# Patient Record
Sex: MALE | Race: WHITE | NOT HISPANIC OR LATINO | Employment: UNEMPLOYED | URBAN - NONMETROPOLITAN AREA
[De-identification: names, ages, dates, MRNs, and addresses within clinical notes are randomized per-mention and may not be internally consistent; named-entity substitution may affect disease eponyms.]

---

## 2022-04-26 ENCOUNTER — HOSPITAL ENCOUNTER (EMERGENCY)
Facility: HOSPITAL | Age: 32
Discharge: HOME | End: 2022-04-26
Attending: EMERGENCY MEDICINE | Admitting: EMERGENCY MEDICINE

## 2022-04-26 ENCOUNTER — APPOINTMENT (OUTPATIENT)
Dept: ULTRASOUND IMAGING | Facility: HOSPITAL | Age: 32
End: 2022-04-26

## 2022-04-26 VITALS
BODY MASS INDEX: 45.62 KG/M2 | RESPIRATION RATE: 19 BRPM | WEIGHT: 308 LBS | TEMPERATURE: 99.4 F | OXYGEN SATURATION: 99 % | HEART RATE: 100 BPM | SYSTOLIC BLOOD PRESSURE: 168 MMHG | DIASTOLIC BLOOD PRESSURE: 97 MMHG | HEIGHT: 69 IN

## 2022-04-26 DIAGNOSIS — M79.605 ACUTE PAIN OF LEFT LOWER EXTREMITY: Primary | ICD-10-CM

## 2022-04-26 PROCEDURE — 93971 EXTREMITY STUDY: CPT

## 2022-04-26 PROCEDURE — 99282 EMERGENCY DEPT VISIT SF MDM: CPT

## 2022-04-26 NOTE — DISCHARGE INSTRUCTIONS
As we discussed, please take 600 mg of ibuprofen every 6 hours as needed and follow-up with your family doctor Tuesday for recheck.  Return to the ER for any worsening symptoms.

## 2022-04-26 NOTE — ED PROVIDER NOTES
Subjective   Chief Complaint: Left lower extremity pain and swelling  History of Present Illness: 31-year-old male present for evaluation of above complaint.  He states it started Friday.  No known injury.  He had pain and swelling to the left upper calf that is now moved up to the medial distal thigh just above the knee.  No chest pain or shortness of breath.  No history of DVT or PE.  Went to [facility] and was told to come to the ED for eval.  States that [facility] left calf was measured at 20 inches and right was 19.  No symptoms in the right leg.  Recent travel or significant mobilization.  He states the swelling and prominent vein in his calf seems to have gone down some but now has had more proximal symptoms  Onset: 5 days ago  Timing: Ongoing, improving in the calf but now seems to be extending proximally  Exacerbating / Alleviating factors: Worse with palpation of the calf  Associated symptoms: None      Nurses Notes reviewed and agree, including vitals, allergies, social history and prior medical history.          Review of Systems   Constitutional: Negative.    HENT: Negative.    Eyes: Negative.    Respiratory: Negative.    Cardiovascular: Negative.    Gastrointestinal: Negative.    Genitourinary: Negative.    Musculoskeletal:        Left lower extremity pain and swelling   Skin: Negative.    Allergic/Immunologic: Negative.    Neurological: Negative.    Psychiatric/Behavioral: Negative.    All other systems reviewed and are negative.      History reviewed. No pertinent past medical history.    No Known Allergies    No past surgical history on file.    History reviewed. No pertinent family history.    Social History     Socioeconomic History   • Marital status: Single           Objective   Physical Exam  Vitals and nursing note reviewed.   Constitutional:       General: He is not in acute distress.     Appearance: Normal appearance. He is obese. He is not ill-appearing, toxic-appearing or diaphoretic.   HENT:       Head: Normocephalic and atraumatic.      Nose: Nose normal.   Eyes:      Extraocular Movements: Extraocular movements intact.   Cardiovascular:      Rate and Rhythm: Normal rate and regular rhythm.      Pulses: Normal pulses.   Pulmonary:      Effort: Pulmonary effort is normal.   Abdominal:      General: Abdomen is flat.   Musculoskeletal:         General: Normal range of motion.      Cervical back: Normal range of motion.      Right lower leg: Normal.      Left lower leg: Swelling and tenderness present. No deformity, lacerations or bony tenderness. No edema.   Skin:     General: Skin is warm and dry.      Capillary Refill: Capillary refill takes less than 2 seconds.   Neurological:      General: No focal deficit present.      Mental Status: He is alert. Mental status is at baseline.   Psychiatric:         Mood and Affect: Mood normal.         Behavior: Behavior normal.         Procedures           ED Course      No signs of DVT.  No signs of cellulitis.  Lower extremity neurovascular intact.  Has follow-up with PCP Tuesday.  We will have him take NSAIDs until then return if worse.                                           MDM    Final diagnoses:   Acute pain of left lower extremity       ED Disposition  ED Disposition     ED Disposition   Discharge    Condition   Stable    Comment   --             Xuan Borrero MD  [address] [facility] [address]  [phone]    On 5/3/2022           Medication List      No changes were made to your prescriptions during this visit.          Jose Elias Varela PA-C  04/26/22 1426

## 2023-08-25 ENCOUNTER — TRANSCRIBE ORDERS (OUTPATIENT)
Dept: ADMINISTRATIVE | Facility: HOSPITAL | Age: 33
End: 2023-08-25
Payer: COMMERCIAL

## 2023-08-25 DIAGNOSIS — R74.01 NONSPECIFIC ELEVATION OF LEVELS OF TRANSAMINASE OR LACTIC ACID DEHYDROGENASE (LDH): Primary | ICD-10-CM

## 2023-08-25 DIAGNOSIS — R74.02 NONSPECIFIC ELEVATION OF LEVELS OF TRANSAMINASE OR LACTIC ACID DEHYDROGENASE (LDH): Primary | ICD-10-CM

## 2023-10-18 ENCOUNTER — HOSPITAL ENCOUNTER (OUTPATIENT)
Dept: ULTRASOUND IMAGING | Facility: HOSPITAL | Age: 33
Discharge: HOME | End: 2023-10-18
Admitting: FAMILY MEDICINE
Payer: COMMERCIAL

## 2023-10-18 DIAGNOSIS — R74.02 NONSPECIFIC ELEVATION OF LEVELS OF TRANSAMINASE OR LACTIC ACID DEHYDROGENASE (LDH): ICD-10-CM

## 2023-10-18 DIAGNOSIS — R74.01 NONSPECIFIC ELEVATION OF LEVELS OF TRANSAMINASE OR LACTIC ACID DEHYDROGENASE (LDH): ICD-10-CM

## 2023-10-18 PROCEDURE — 76705 ECHO EXAM OF ABDOMEN: CPT

## 2023-10-31 ENCOUNTER — OFFICE VISIT (OUTPATIENT)
Dept: PSYCHIATRY | Facility: CLINIC | Age: 33
End: 2023-10-31
Payer: COMMERCIAL

## 2023-10-31 VITALS
HEART RATE: 100 BPM | OXYGEN SATURATION: 97 % | HEIGHT: 69 IN | SYSTOLIC BLOOD PRESSURE: 124 MMHG | DIASTOLIC BLOOD PRESSURE: 70 MMHG | BODY MASS INDEX: 45.74 KG/M2 | WEIGHT: 308.8 LBS

## 2023-10-31 DIAGNOSIS — F33.2 SEVERE EPISODE OF RECURRENT MAJOR DEPRESSIVE DISORDER, WITHOUT PSYCHOTIC FEATURES: ICD-10-CM

## 2023-10-31 DIAGNOSIS — F41.1 GENERALIZED ANXIETY DISORDER: Primary | ICD-10-CM

## 2023-10-31 RX ORDER — BUPROPION HYDROCHLORIDE 100 MG/1
100 TABLET, EXTENDED RELEASE ORAL 2 TIMES DAILY
Qty: 60 TABLET | Refills: 1 | Status: SHIPPED | OUTPATIENT
Start: 2023-10-31

## 2023-10-31 RX ORDER — ALLOPURINOL 300 MG/1
1 TABLET ORAL DAILY
COMMUNITY
Start: 2022-03-02

## 2023-10-31 RX ORDER — FLUOXETINE HYDROCHLORIDE 40 MG/1
1 CAPSULE ORAL DAILY
COMMUNITY
Start: 2023-07-25
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
## 2023-12-13 ENCOUNTER — TELEPHONE (OUTPATIENT)
Dept: PSYCHIATRY | Facility: CLINIC | Age: 33
End: 2023-12-13
Payer: COMMERCIAL

## 2023-12-13 NOTE — TELEPHONE ENCOUNTER
Roby called and wanted me to let you know that he is needing a letter that he can be on temporary disablement to get [employer] benefits. He said when he spoke to them that they said when he turns into the letter, they would give him forms that you and him both need to fill out. Thank you.

## 2023-12-14 ENCOUNTER — TELEMEDICINE (OUTPATIENT)
Dept: PSYCHIATRY | Facility: CLINIC | Age: 33
End: 2023-12-14
Payer: COMMERCIAL

## 2023-12-14 DIAGNOSIS — F41.1 GENERALIZED ANXIETY DISORDER: Primary | ICD-10-CM

## 2023-12-14 DIAGNOSIS — Z79.899 MEDICATION MANAGEMENT: ICD-10-CM

## 2023-12-14 DIAGNOSIS — F34.1 PERSISTENT DEPRESSIVE DISORDER WITH ANXIOUS DISTRESS, CURRENTLY MODERATE: ICD-10-CM

## 2023-12-14 RX ORDER — FLUOXETINE HYDROCHLORIDE 40 MG/1
40 CAPSULE ORAL DAILY
Qty: 90 CAPSULE | Refills: 0 | Status: SHIPPED | OUTPATIENT
Start: 2023-12-14

## 2023-12-14 NOTE — PROGRESS NOTES
This provider is located at [facility], Behavioral Health, [address], [address] [facility] [place] [address] in [place] [address], using a secure [employer] Video Visit through [employer]. Patient is being seen remotely via telehealth at their home address in [place] [address], and stated they are in a secure environment for this session. The patient's condition being diagnosed/treated is appropriate for telemedicine. The provider identified herself as well as her credentials. The patient, and/or patients guardian, consent to be seen remotely, and when consent is given they understand that the consent allows for patient identifiable information to be sent to a third party as needed. They may refuse to be seen remotely at any time. The electronic data is encrypted and password protected, and the patient and/or guardian has been advised of the potential risks to privacy not withstanding such measures.     You have chosen to receive care through a telehealth visit.  Do you consent to use a video/audio connection for your medical care today? Yes    Subjective   Roby Marvin is a 33 y.o. male who presents today for follow up    Chief Complaint:  poor concentration, anxiety and depression    History of Present Illness:   History of Present Illness  Roby Marvin presents today via MyChart video visit for follow-up to discuss CPT 3 results and possible medication options.  Currently taking Prozac 40 mg daily along with Wellbutrin  mg twice daily.  Says that he noticed some improvement in symptoms associated with focus and concentration when initially began Wellbutrin. Feels that decreased in severity of symptoms lasted for only a short time. No longer feels that Wellbutrin SR has been effective.  CPT 3 completed on 11/3/2023, that there is a total of 4 atypical T-scores which is associated with a moderate likelihood of having a disorder characterized by attention deficits, such as ADHD.  His profile of scores and response  pattern indicates that he may have issues related to inattentiveness (strong indication) and vigilance (some indication).  Reports anxiety and depression are adequately controlled with Prozac. Verbalizes having a constant symptoms of depression that never actually go away, just improve at times. These depressive symptoms have been present for several years. Rates current depression 6-7/10, rates anxiety 6-8/10 on a 0-10 scale with 10 being the worst. Struggling to initiate or complete tasks, maintaining focus and becomes easily distracted. Sleeping up to 8 hours per night with some daytime napping. Denies any issues with appetite. Denies SI/HI.  PHQ-9 total score: 12, TESSIE-7 total score: 5.    Previous Psych Meds: Currently taking fluoxetine (since 7/25/2023) prescribed by PCP, no other previous psychiatric medications    The following portions of the patient's history were reviewed and updated as appropriate: allergies, current medications, past family history, past medical history, past social history, past surgical history and problem list.      Past Medical History:  Past Medical History:   Diagnosis Date    Anxiety ~07/25/23    Depression ~07/25/23    Liver disease 10/17/23    Diagnosed with having fatty liver deposits, not sure if this falls under same category.       Social History:  Social History     Socioeconomic History    Marital status: Single   Tobacco Use    Smoking status: Never     Passive exposure: Yes    Smokeless tobacco: Never    Tobacco comments:     Social smoker, never habitual   Vaping Use    Vaping Use: Never used   Substance and Sexual Activity    Alcohol use: Not Currently     Comment: Social drinker, never habitual    Drug use: Not Currently     Types: Marijuana     Comment: Smoked marijuana socially, never habitual    Sexual activity: Not Currently     Partners: Female     Birth control/protection: Condom, None, Birth control pill       Family History:  Family History   Problem Relation  Age of Onset    Drug abuse Brother     Drug abuse Brother     Seizures Cousin        Past Surgical History:  History reviewed. No pertinent surgical history.    Problem List:  There is no problem list on file for this patient.      Allergy:   Allergies   Allergen Reactions    Banana Itching and Nausea And Vomiting     Itchy throat        Current Medications:   Current Outpatient Medications   Medication Sig Dispense Refill    FLUoxetine (PROzac) 40 MG capsule Take 1 capsule by mouth Daily. 90 capsule 0    allopurinol (ZYLOPRIM) 300 MG tablet Take 1 tablet by mouth Daily.      buPROPion SR (Wellbutrin SR) 100 MG 12 hr tablet Take 1 tablet by mouth 2 (Two) Times a Day. 60 tablet 1     No current facility-administered medications for this visit.     Review of Systems   Constitutional:  Negative for activity change, appetite change, fatigue, unexpected weight gain and unexpected weight loss.   Respiratory:  Negative for shortness of breath.    Cardiovascular:  Negative for chest pain.   Psychiatric/Behavioral:  Positive for decreased concentration, sleep disturbance and depressed mood. Negative for suicidal ideas. The patient is nervous/anxious.      Physical Exam  Constitutional:       General: He is not in acute distress.     Appearance: Normal appearance.   Neurological:      Mental Status: He is alert.     Vitals:   The patient was seen [facility].  Unable to obtain vital signs due to nature of remote visit.    Mental Status Exam:   Hygiene:    Appears good  Cooperation:  Cooperative  Eye Contact:   [facility] r/t video visit  Psychomotor Behavior:  Appropriate  Affect:  Full range and Appropriate  Mood: normal  Hopelessness: Denies  Speech:  Normal  Thought Process:  Goal directed and Linear  Thought Content:  Mood congruent  Suicidal:  None  Homicidal:  None  Hallucinations:  None  Delusion:  None  Memory:  Intact  Orientation:  Person, Place, Time, and Situation  Reliability:   good  Insight:  Fair  Judgement:  Fair  Impulse Control:  Good    Lab Results:   No visits with results within 6 Month(s) from this visit.   Latest known visit with results is:   No results found for any previous visit.       EKG Results:  No orders to display       Assessment & Plan   Problems Addressed this Visit    None  Visit Diagnoses       Generalized anxiety disorder    -  Primary    Relevant Medications    FLUoxetine (PROzac) 40 MG capsule    Persistent depressive disorder with anxious distress, currently moderate        Relevant Medications    FLUoxetine (PROzac) 40 MG capsule    Medication management              Diagnoses         Codes Comments    Generalized anxiety disorder    -  Primary ICD-10-CM: F41.1  ICD-9-CM: 300.02     Persistent depressive disorder with anxious distress, currently moderate     ICD-10-CM: F34.1  ICD-9-CM: 300.4     Medication management     ICD-10-CM: Z79.899  ICD-9-CM: V58.69             Visit Diagnoses:    ICD-10-CM ICD-9-CM   1. Generalized anxiety disorder  F41.1 300.02   2. Persistent depressive disorder with anxious distress, currently moderate  F34.1 300.4   3. Medication management  Z79.899 V58.69       Roby presents today for medication management follow-up via [employer] video visit. CPT 3 results discussed.  Continues to report depression and anxiety, feels that symptoms are adequately controlled with Prozac.  Residual symptoms of anxiety and depression could likely be related to uncontrolled ADHD.  PHQ-9 score of 12 indicating moderate depression, TESSIE-7 score of 5 indicating mild anxiety. Discussed treatment options, voices interest in stimulant medication.  Discussed controlled substance contract agreement and he is agreeable to terms, will sign copy for chart.  Also discussed obtaining UDS.  Once UDS has been obtained and results received, will send Adderall XR 10 mg daily x 15 days.  He is agreeable to call office and update on medication after 2 weeks.  Will continue  Prozac 40 mg daily as previously prescribed.  Agreeable to stop Wellbutrin SR as he does not feel medication has been effective. Denies any adverse effects of medication.  Encouraged to practice good sleep hygiene.  Also discussed importance of counseling, will consider scheduling appointment with counseling.    -Stop Wellbutrin SR due to ineffectiveness   -Start Adderall XR 10 mg daily x 15 days once UDS results have been received.  -Refill Prozac 40 mg daily    -Reviewed previous available documentation and most recent available labs.   JONO reviewed and is appropriate. Patient counseled on use of controlled substances.    GOALS:  Short Term Goals: Patient will be compliant with medication, and patient will have no significant medication related side effects.  Patient will be engaged in psychotherapy as indicated.  Patient will report subjective improvement of symptoms.  Long term goals: To stabilize mood and treat/improve subjective symptoms, the patient will stay out of the hospital, the patient will be at an optimal level of functioning, and the patient will take all medications as prescribed.  The patient/guardian verbalized understanding and agreement with goals that were mutually set.    TREATMENT PLAN: Continue supportive psychotherapy efforts and medications as indicated for patient's diagnosis.  Pharmacological and Non-Pharmacological treatment options discussed during today's visit. Patient/Guardian acknowledged and verbally consented with current treatment plan and was educated on the importance of compliance with treatment and follow-up appointments.      MEDICATION ISSUES:  Discussed medication options and treatment plan of prescribed medication as well as the risks, benefits, any black box warnings, and side effects including potential falls, possible impaired driving, and metabolic adversities among others. Patient is agreeable to call the office with any worsening of symptoms or onset of side  effects, or if any concerns or questions arise.  The contact information for the office is made available to the patient. Patient is agreeable to call 911 or go to the nearest ER should they begin having any SI/HI, or if any urgent concerns arise. No medication side effects or related complaints today.     MEDS ORDERED DURING VISIT:  New Medications Ordered This Visit   Medications    FLUoxetine (PROzac) 40 MG capsule     Sig: Take 1 capsule by mouth Daily.     Dispense:  90 capsule     Refill:  0       FOLLOW UP:  Return in about 6 weeks (around 1/25/2024) for Recheck, Video visit.             This document has been electronically signed by NICCI Gray  December 14, 2023 09:23 EST    Please note that portions of this note were completed with a voice recognition program. Efforts were made to edit dictation, but occasionally words are mistranscribed.

## 2024-01-25 ENCOUNTER — TELEMEDICINE (OUTPATIENT)
Dept: PSYCHIATRY | Facility: CLINIC | Age: 34
End: 2024-01-25
Payer: COMMERCIAL

## 2024-01-25 DIAGNOSIS — F90.0 ADHD, PREDOMINANTLY INATTENTIVE TYPE: ICD-10-CM

## 2024-01-25 DIAGNOSIS — F34.1 PERSISTENT DEPRESSIVE DISORDER WITH ANXIOUS DISTRESS, CURRENTLY MODERATE: Primary | ICD-10-CM

## 2024-01-25 DIAGNOSIS — F41.1 GENERALIZED ANXIETY DISORDER: ICD-10-CM

## 2024-01-25 RX ORDER — ATOMOXETINE 40 MG/1
40 CAPSULE ORAL DAILY
Qty: 30 CAPSULE | Refills: 1 | Status: SHIPPED | OUTPATIENT
Start: 2024-01-25

## 2024-01-25 NOTE — PROGRESS NOTES
This provider is located at [facility], Behavioral Health, [address], [address] [place] [address], using a secure [employer] Video Visit through [employer]. Patient is being seen remotely via telehealth at their home address in [place] [address], and stated they are in a secure environment for this session. The patient's condition being diagnosed/treated is appropriate for telemedicine. The provider identified herself as well as her credentials. The patient, and/or patients guardian, consent to be seen remotely, and when consent is given they understand that the consent allows for patient identifiable information to be sent to a third party as needed. They may refuse to be seen remotely at any time. The electronic data is encrypted and password protected, and the patient and/or guardian has been advised of the potential risks to privacy not withstanding such measures.     You have chosen to receive care through a telehealth visit.  Do you consent to use a video/audio connection for your medical care today? Yes    Subjective   Roby Marvin is a 33 y.o. male who presents today for follow up    Chief Complaint:  poor concentration, anxiety and depression    History of Present Illness:   History of Present Illness  Roby Marvin presents today via MyChart video visit for medication management follow-up. Currently taking Prozac 40 mg daily.  Says that he noticed improvement in mood after stopping Wellbutrin SR. Felt more on edge and slightly irritable with medication.  Says that he continues to experience situational anxiety with levels that vary depending on the situations.  Has been working on maintaining a good routine. Says that he still has difficulty with making and keeping plans.  Motivation and task initiation/completion are other bothersome issues.  Feels that his inability to complete tasks often leads to symptoms of depression.  Symptoms of depression include having low mood, feeling  down and decreased interest in activities.  Rates overall depression 4-5/10 on a 0-10 scale with 10 being the worst.  Says that he is sleeping well, obtaining approximately 7 hours per night.  Reports appetite is good.  Denies any SI/HI or AVH.  PHQ-9 total score: 15, TESSIE-7 total score: 6.    Previous Psych Meds: Currently taking fluoxetine (since 7/25/2023) prescribed by PCP, no other previous psychiatric medications    The following portions of the patient's history were reviewed and updated as appropriate: allergies, current medications, past family history, past medical history, past social history, past surgical history and problem list.      Past Medical History:  Past Medical History:   Diagnosis Date    Anxiety ~07/25/23    Depression ~07/25/23    Liver disease 10/17/23    Diagnosed with having fatty liver deposits, not sure if this falls under same category.       Social History:  Social History     Socioeconomic History    Marital status: Single   Tobacco Use    Smoking status: Never     Passive exposure: Yes    Smokeless tobacco: Never    Tobacco comments:     Social smoker, never habitual   Vaping Use    Vaping Use: Never used   Substance and Sexual Activity    Alcohol use: Not Currently     Comment: Social drinker, never habitual    Drug use: Not Currently     Types: Marijuana     Comment: Smoked marijuana socially, never habitual    Sexual activity: Not Currently     Partners: Female     Birth control/protection: Condom, None, Birth control pill       Family History:  Family History   Problem Relation Age of Onset    Drug abuse Brother     Drug abuse Brother     Seizures Cousin        Past Surgical History:  History reviewed. No pertinent surgical history.    Problem List:  There is no problem list on file for this patient.      Allergy:   Allergies   Allergen Reactions    Banana Itching and Nausea And Vomiting     Itchy throat        Current Medications:   Current Outpatient Medications   Medication  Sig Dispense Refill    allopurinol (ZYLOPRIM) 300 MG tablet Take 1 tablet by mouth Daily.      atomoxetine (Strattera) 40 MG capsule Take 1 capsule by mouth Daily. 30 capsule 1    FLUoxetine (PROzac) 40 MG capsule Take 1 capsule by mouth Daily. 90 capsule 0     No current facility-administered medications for this visit.     Review of Systems   Constitutional:  Negative for activity change, appetite change, fatigue, unexpected weight gain and unexpected weight loss.   Respiratory:  Negative for shortness of breath.    Cardiovascular:  Negative for chest pain.   Psychiatric/Behavioral:  Positive for decreased concentration and depressed mood. Negative for sleep disturbance and suicidal ideas. The patient is nervous/anxious.      Physical Exam  Constitutional:       General: He is not in acute distress.     Appearance: Normal appearance.   Neurological:      Mental Status: He is alert.     Vitals:   The patient was seen remotely today via a MyChart Video Visit through [facility].  Unable to obtain vital signs due to nature of remote visit.    Mental Status Exam:   Hygiene:    Appears good  Cooperation:  Cooperative  Eye Contact:   [facility] r/t video visit  Psychomotor Behavior:  Appropriate  Affect:  Full range and Appropriate  Mood: normal  Hopelessness: Denies  Speech:  Normal  Thought Process:  Goal directed and Linear  Thought Content:  Mood congruent  Suicidal:  None  Homicidal:  None  Hallucinations:  None  Delusion:  None  Memory:  Intact  Orientation:  Person, Place, Time, and Situation  Reliability:  good  Insight:  Fair  Judgement:  Fair  Impulse Control:  Good    Lab Results:   No visits with results within 6 Month(s) from this visit.   Latest known visit with results is:   No results found for any previous visit.       EKG Results:  No orders to display       Assessment & Plan   Problems Addressed this Visit    None  Visit Diagnoses       Persistent depressive disorder with anxious distress, currently moderate    -   Primary    Relevant Medications    atomoxetine (Strattera) 40 MG capsule    Generalized anxiety disorder        Relevant Medications    atomoxetine (Strattera) 40 MG capsule    ADHD, predominantly inattentive type        Relevant Medications    atomoxetine (Strattera) 40 MG capsule          Diagnoses         Codes Comments    Persistent depressive disorder with anxious distress, currently moderate    -  Primary ICD-10-CM: F34.1  ICD-9-CM: 300.4     Generalized anxiety disorder     ICD-10-CM: F41.1  ICD-9-CM: 300.02     ADHD, predominantly inattentive type     ICD-10-CM: F90.0  ICD-9-CM: 314.00             Visit Diagnoses:    ICD-10-CM ICD-9-CM   1. Persistent depressive disorder with anxious distress, currently moderate  F34.1 300.4   2. Generalized anxiety disorder  F41.1 300.02   3. ADHD, predominantly inattentive type  F90.0 314.00     Roby presents today for medication management follow-up.  He feels that overall anxiety and depression are well controlled with current medication regimen.  Continues to struggle with ADHD type symptoms.  Says that transportation has been an option for him and has been unable to obtain UDS as discussed last visit.  Verbalizes that he is interested in initiating nonstimulant medication for ADHD. Discussed plan of medication regimen/options.  Will continue with Prozac 40 mg daily and start Strattera 40 mg daily.  Encouraged him to continue working on daily routines and consider making appointment with counseling.    -Start Strattera 40 mg daily  -Continue Prozac 40 mg daily    -Reviewed previous available documentation and no available labs on file. JONO reviewed and is appropriate.    GOALS:  Short Term Goals: Patient will be compliant with medication, and patient will have no significant medication related side effects.  Patient will be engaged in psychotherapy as indicated.  Patient will report subjective improvement of symptoms.  Long term goals: To stabilize mood and  treat/improve subjective symptoms, the patient will stay out of the hospital, the patient will be at an optimal level of functioning, and the patient will take all medications as prescribed.  The patient/guardian verbalized understanding and agreement with goals that were mutually set.    TREATMENT PLAN: Continue supportive psychotherapy efforts and medications as indicated for patient's diagnosis.  Pharmacological and Non-Pharmacological treatment options discussed during today's visit. Patient/Guardian acknowledged and verbally consented with current treatment plan and was educated on the importance of compliance with treatment and follow-up appointments.      MEDICATION ISSUES:  Discussed medication options and treatment plan of prescribed medication as well as the risks, benefits, any black box warnings, and side effects including potential falls, possible impaired driving, and metabolic adversities among others. Patient is agreeable to call the office with any worsening of symptoms or onset of side effects, or if any concerns or questions arise.  The contact information for the office is made available to the patient. Patient is agreeable to call 911 or go to the nearest ER should they begin having any SI/HI, or if any urgent concerns arise. No medication side effects or related complaints today.     MEDS ORDERED DURING VISIT:  New Medications Ordered This Visit   Medications    atomoxetine (Strattera) 40 MG capsule     Sig: Take 1 capsule by mouth Daily.     Dispense:  30 capsule     Refill:  1       FOLLOW UP:  Return in about 8 weeks (around 3/21/2024) for Recheck, Video visit.             This document has been electronically signed by NICCI Gray  February 8, 2024 10:20 EST    Please note that portions of this note were completed with a voice recognition program. Efforts were made to edit dictation, but occasionally words are mistranscribed.

## 2024-03-13 ENCOUNTER — TELEPHONE (OUTPATIENT)
Dept: PSYCHIATRY | Facility: CLINIC | Age: 34
End: 2024-03-13
Payer: COMMERCIAL

## 2024-03-13 NOTE — TELEPHONE ENCOUNTER
Sho, patient called in asking if you could complete forms for his snap benefits. He emailed copy to me, I will hand to you,and also scanned into media. I advised we would let you review, could not guarantee we can complete, but if completed, there is a $25 charge before  or emailing back to patient. He understood. I also advised it can take up to a week. Thank you

## 2024-03-14 DIAGNOSIS — F90.0 ADHD, PREDOMINANTLY INATTENTIVE TYPE: ICD-10-CM

## 2024-03-14 RX ORDER — ATOMOXETINE HYDROCHLORIDE 40 MG/1
40 CAPSULE ORAL DAILY
Qty: 30 CAPSULE | Refills: 1 | Status: SHIPPED | OUTPATIENT
Start: 2024-03-14

## 2024-03-14 NOTE — TELEPHONE ENCOUNTER
Rx Refill Note  Requested Prescriptions     Pending Prescriptions Disp Refills    Strattera 40 MG capsule 30 capsule 1     Sig: Take 1 capsule by mouth Daily.      Last office visit with prescribing clinician: 10/31/2023   Last telemedicine visit with prescribing clinician: 1/25/2024   Next office visit with prescribing clinician: 3/25/2024                         Would you like a call back once the refill request has been completed: [] Yes [] No    If the office needs to give you a call back, can they leave a voicemail: [] Yes [] No    Senia Flores CMA  03/14/24, 12:58 EDT

## 2024-03-14 NOTE — TELEPHONE ENCOUNTER
Will give 90 days to get medication regimen optimized. Please make sure he is actively engaging in therapy. If he is not compliant with medication and treatment, will not be able to continue.

## 2024-03-18 DIAGNOSIS — F90.0 ADHD, PREDOMINANTLY INATTENTIVE TYPE: ICD-10-CM

## 2024-03-18 DIAGNOSIS — F34.1 PERSISTENT DEPRESSIVE DISORDER WITH ANXIOUS DISTRESS, CURRENTLY MODERATE: ICD-10-CM

## 2024-03-18 DIAGNOSIS — F41.1 GENERALIZED ANXIETY DISORDER: ICD-10-CM

## 2024-03-18 RX ORDER — FLUOXETINE HYDROCHLORIDE 40 MG/1
40 CAPSULE ORAL DAILY
Qty: 90 CAPSULE | Refills: 0 | Status: SHIPPED | OUTPATIENT
Start: 2024-03-18 | End: 2024-03-19 | Stop reason: SDUPTHER

## 2024-03-18 NOTE — TELEPHONE ENCOUNTER
Rx Refill Note  Requested Prescriptions     Pending Prescriptions Disp Refills    FLUoxetine (PROzac) 40 MG capsule 90 capsule 0     Sig: Take 1 capsule by mouth Daily.      Last office visit with prescribing clinician: 10/31/2023   Last telemedicine visit with prescribing clinician: 1/25/2024   Next office visit with prescribing clinician: 3/25/2024                         Would you like a call back once the refill request has been completed: [] Yes [] No    If the office needs to give you a call back, can they leave a voicemail: [] Yes [] No    Delfino Hutchins MA  03/18/24, 13:16 EDT

## 2024-03-19 RX ORDER — ATOMOXETINE HYDROCHLORIDE 40 MG/1
40 CAPSULE ORAL DAILY
Qty: 90 CAPSULE | Refills: 0 | Status: SHIPPED | OUTPATIENT
Start: 2024-03-19

## 2024-03-19 RX ORDER — FLUOXETINE HYDROCHLORIDE 40 MG/1
40 CAPSULE ORAL DAILY
Qty: 90 CAPSULE | Refills: 0 | Status: SHIPPED | OUTPATIENT
Start: 2024-03-19

## 2024-03-19 NOTE — TELEPHONE ENCOUNTER
[facility] requested Patient prescriptions to be filled thru them. Called Patient he said he did ask for this due to having a hard time getting to [facility] to pick  up meds. He also asked if provider had time to do paperwork for snap and taking off work. I advised Patient forms can take two weeks to get completed.    Rx Refill Note  Requested Prescriptions     Pending Prescriptions Disp Refills    Strattera 40 MG capsule 90 capsule 0     Sig: Take 1 capsule by mouth Daily.    FLUoxetine (PROzac) 40 MG capsule 90 capsule 0     Sig: Take 1 capsule by mouth Daily.     Signed Prescriptions Disp Refills    FLUoxetine (PROzac) 40 MG capsule 90 capsule 0     Sig: Take 1 capsule by mouth Daily.     Authorizing Provider: JESI GAN     Ordering User: PATTI GORDON      Last office visit with prescribing clinician: 10/31/2023   Last telemedicine visit with prescribing clinician: 1/25/2024   Next office visit with prescribing clinician: 3/25/2024                         Would you like a call back once the refill request has been completed: [] Yes [] No    If the office needs to give you a call back, can they leave a voicemail: [] Yes [] No    Senia Flores CMA  03/19/24, 11:06 EDT

## 2024-03-25 ENCOUNTER — TELEMEDICINE (OUTPATIENT)
Dept: PSYCHIATRY | Facility: CLINIC | Age: 34
End: 2024-03-25
Payer: COMMERCIAL

## 2024-03-25 DIAGNOSIS — F90.0 ADHD, PREDOMINANTLY INATTENTIVE TYPE: Primary | ICD-10-CM

## 2024-03-25 DIAGNOSIS — F34.1 PERSISTENT DEPRESSIVE DISORDER WITH ANXIOUS DISTRESS, CURRENTLY MODERATE: ICD-10-CM

## 2024-03-25 DIAGNOSIS — F41.1 GENERALIZED ANXIETY DISORDER: ICD-10-CM

## 2024-03-25 NOTE — PROGRESS NOTES
This provider is located at [facility], [facility], [address], [address] in [place] [address], using a secure Zoomdatahart Video Visit through [employer]. Patient is being seen remotely via telehealth at their home address in [place] [address], and stated they are in a secure environment for this session. The patient's condition being diagnosed/treated is appropriate for telemedicine. The provider identified herself as well as her credentials. The patient, and/or patients guardian, consent to be seen remotely, and when consent is given they understand that the consent allows for patient identifiable information to be sent to a third party as needed. They may refuse to be seen remotely at any time. The electronic data is encrypted and password protected, and the patient and/or guardian has been advised of the potential risks to privacy not withstanding such measures.     You have chosen to receive care through a telehealth visit.  Do you consent to use a video/audio connection for your medical care today? Yes    Subjective   Roby Marvin is a 33 y.o. male who presents today for follow up    Chief Complaint:  poor concentration, anxiety and depression    History of Present Illness:   History of Present Illness  Roby Marvin presents today for medication management follow-up via MyChart video visit.  Currently taking fluoxetine 40 mg daily.  Strattera 40 mg daily was prescribed last visit, but has been unable to obtain medication due to issues with insurance approval.  Medication has recently been approved and will be shipped from pharmacy.  Continues to struggle with feeling down and having no motivation.  Struggles focus and feels that his attention span is extremely short.  Feels that there have been more days when mood has been better overall. Reports a couple of days of being more irritable and on edge.  Has been slightly more active since last visit, doing some exercises at home and  going for a walk.  Typically sleeps about 7 to 8 hours per night, has not slept well over the past 2 nights.  Has had some situational stressors that could be contributing to symptoms of depression, anxiety and difficulty sleeping. Denies any issues with appetite.  Reports intermittent passive thoughts of SI without plan or intent. Denies any current SI/HI or AVH.  PHQ-9 total score: 14, TESSIE-7 total score: 15.    Previous Psych Meds: Currently taking fluoxetine (since 7/25/2023) prescribed by PCP, Wellbutrin SR    The following portions of the patient's history were reviewed and updated as appropriate: allergies, current medications, past family history, past medical history, past social history, past surgical history and problem list.      Past Medical History:  Past Medical History:   Diagnosis Date    Anxiety ~07/25/23    Depression ~07/25/23    Liver disease 10/17/23    Diagnosed with having fatty liver deposits, not sure if this falls under same category.       Social History:  Social History     Socioeconomic History    Marital status: Single   Tobacco Use    Smoking status: Never     Passive exposure: Yes    Smokeless tobacco: Never    Tobacco comments:     Social smoker, never habitual   Vaping Use    Vaping status: Never Used   Substance and Sexual Activity    Alcohol use: Not Currently     Comment: Social drinker, never habitual    Drug use: Not Currently     Types: Marijuana     Comment: Smoked marijuana socially, never habitual    Sexual activity: Not Currently     Partners: Female     Birth control/protection: Condom, None, Birth control pill       Family History:  Family History   Problem Relation Age of Onset    Drug abuse Brother     Drug abuse Brother     Seizures Cousin        Past Surgical History:  History reviewed. No pertinent surgical history.    Problem List:  There is no problem list on file for this patient.      Allergy:   Allergies   Allergen Reactions    Banana Itching and Nausea And  Vomiting     Itchy throat        Current Medications:   Current Outpatient Medications   Medication Sig Dispense Refill    allopurinol (ZYLOPRIM) 300 MG tablet Take 1 tablet by mouth Daily.      FLUoxetine (PROzac) 40 MG capsule Take 1 capsule by mouth Daily. 90 capsule 0    Strattera 40 MG capsule Take 1 capsule by mouth Daily. 90 capsule 0     No current facility-administered medications for this visit.     Review of Systems   Constitutional:  Negative for activity change, appetite change, fatigue, unexpected weight gain and unexpected weight loss.   Respiratory:  Negative for shortness of breath.    Cardiovascular:  Negative for chest pain.   Psychiatric/Behavioral:  Positive for decreased concentration and depressed mood. Negative for sleep disturbance and suicidal ideas. The patient is nervous/anxious.      Physical Exam  Constitutional:       General: He is not in acute distress.     Appearance: Normal appearance.   Neurological:      Mental Status: He is alert.     Vitals:   The patient was seen [facility] today [facility].  Unable to obtain vital signs due to nature of remote visit.    Mental Status Exam:   Hygiene:    Appears good  Cooperation:  Cooperative  Eye Contact:   [facility] r/t video visit  Psychomotor Behavior:  Appropriate  Affect:  Full range and Appropriate  Mood: normal  Hopelessness: Denies  Speech:  Normal  Thought Process:  Goal directed and Linear  Thought Content:  Mood congruent  Suicidal:  None  Homicidal:  None  Hallucinations:  None  Delusion:  None  Memory:  Intact  Orientation:  Person, Place, Time, and Situation  Reliability:  good  Insight:  Fair  Judgement:  Fair  Impulse Control:  Good    Lab Results:   No visits with results within 6 Month(s) from this visit.   Latest known visit with results is:   No results found for any previous visit.       EKG Results:  No orders to display       Assessment & Plan   Problems Addressed this Visit    None  Visit Diagnoses        ADHD, predominantly inattentive type    -  Primary    Generalized anxiety disorder        Persistent depressive disorder with anxious distress, currently moderate              Diagnoses         Codes Comments    ADHD, predominantly inattentive type    -  Primary ICD-10-CM: F90.0  ICD-9-CM: 314.00     Generalized anxiety disorder     ICD-10-CM: F41.1  ICD-9-CM: 300.02     Persistent depressive disorder with anxious distress, currently moderate     ICD-10-CM: F34.1  ICD-9-CM: 300.4             Visit Diagnoses:    ICD-10-CM ICD-9-CM   1. ADHD, predominantly inattentive type  F90.0 314.00   2. Generalized anxiety disorder  F41.1 300.02   3. Persistent depressive disorder with anxious distress, currently moderate  F34.1 300.4       Roby presents today via MyChart video visit for medication management follow up.  Continues to struggle with symptoms associated with depression, anxiety and ADHD.  Has been unable to start Strattera as discussed last visit due to issues with insurance.  Medication has been approved and is agreeable to start Strattera as previously planned.  We will continue with Prozac 40 mg daily as he feels medication is helpful with improving mood. Denies any adverse effects of medication.  Agreeable to start counseling and will make appointment for next available.    -Start Strattera 40 mg daily  -Continue Prozac 40 mg daily    -Reviewed previous available documentation and no available labs on file. JONO reviewed and is appropriate.    GOALS:  Short Term Goals: Patient will be compliant with medication, and patient will have no significant medication related side effects.  Patient will be engaged in psychotherapy as indicated.  Patient will report subjective improvement of symptoms.  Long term goals: To stabilize mood and treat/improve subjective symptoms, the patient will stay out of the hospital, the patient will be at an optimal level of functioning, and the patient will take all medications  as prescribed.  The patient/guardian verbalized understanding and agreement with goals that were mutually set.    TREATMENT PLAN: Continue supportive psychotherapy efforts and medications as indicated for patient's diagnosis.  Pharmacological and Non-Pharmacological treatment options discussed during today's visit. Patient/Guardian acknowledged and verbally consented with current treatment plan and was educated on the importance of compliance with treatment and follow-up appointments.      MEDICATION ISSUES:  Discussed medication options and treatment plan of prescribed medication as well as the risks, benefits, any black box warnings, and side effects including potential falls, possible impaired driving, and metabolic adversities among others. Patient is agreeable to call the office with any worsening of symptoms or onset of side effects, or if any concerns or questions arise.  The contact information for the office is made available to the patient. Patient is agreeable to call 911 or go to the nearest ER should they begin having any SI/HI, or if any urgent concerns arise. No medication side effects or related complaints today.     MEDS ORDERED DURING VISIT:  No orders of the defined types were placed in this encounter.      FOLLOW UP:  Return in about 8 weeks (around 5/20/2024).    I spent 30 minutes caring for Roby on this date of service. This time includes time spent by me in the following activities: preparing for the visit, obtaining and/or reviewing a separately obtained history, performing a medically appropriate examination and/or evaluation, counseling and educating the patient/family/caregiver, ordering medications, tests, or procedures and documenting information in the medical record.           This document has been electronically signed by NICCI Gray  March 25, 2024 09:06 EDT    Please note that portions of this note were completed with a voice recognition program. Efforts were made to edit  dictation, but occasionally words are mistranscribed.

## 2024-04-23 ENCOUNTER — TELEPHONE (OUTPATIENT)
Dept: PSYCHIATRY | Facility: CLINIC | Age: 34
End: 2024-04-23
Payer: COMMERCIAL

## 2024-04-23 NOTE — TELEPHONE ENCOUNTER
Patient states that he has a discrepancy with us that we have not sent the Fluoxetine to [facility] on 03/19/2024. He said now he can't get a refill until May.     Called [facility] [phone] to verify if Patient got refill. She said to soon to fill until 05/29/2024 due to local [facility] had processed a refill but it was not picked up so they can now get this ready if Patient wants.     I called Patient explained what happened advised him it would take a few buisness days to get his medictaion but if he wanted it thru [facility]  he could call them back that they had placed on file.

## 2024-05-03 ENCOUNTER — TELEMEDICINE (OUTPATIENT)
Dept: PSYCHIATRY | Facility: CLINIC | Age: 34
End: 2024-05-03
Payer: COMMERCIAL

## 2024-05-03 DIAGNOSIS — F32.A DEPRESSION, UNSPECIFIED DEPRESSION TYPE: Primary | ICD-10-CM

## 2024-05-03 DIAGNOSIS — F90.0 ATTENTION DEFICIT HYPERACTIVITY DISORDER (ADHD), PREDOMINANTLY INATTENTIVE TYPE: ICD-10-CM

## 2024-05-03 PROBLEM — F90.9 ADHD: Status: ACTIVE | Noted: 2024-05-03

## 2024-05-03 NOTE — PROGRESS NOTES
Video Visit Adult       Date Encounter: 2024   Name: Roby Marvin  MRN: [id number]  : 1990    Time In: 9:00 AM  Time Out: 9:59 AM      Referring Provider: Xuan Borrero MD    Chief Complaint:      ICD-10-CM ICD-9-CM   1. Depression, unspecified depression type  F32.A 311   2. Attention deficit hyperactivity disorder (ADHD), predominantly inattentive type  F90.0 314.00        The provider is located at [facility] [address], [address], [place], [place]. using a secure MeetMe[employer]xt Video Visit through [employer] for assessment with Annabel Sterling, TALW, MSW.  The Patient is seen remotely at their home address in [place], using a private computer, phone or tablet.  Patient is being seen remotely via telehealth at home address in [place] and stated they are in a secure environment for this session using [facility] Chart. The patient's condition being diagnosed/treated is appropriate for telemedicine. The provider identified self as well as credentials. The patient, and/or patients guardian, consent to be seen remotely, and when consent is given they understand that the consent allows for patient identifiable information to be sent to a third party as needed. They may refuse to be seen remotely at any time. The electronic data is encrypted and password protected, and the patient and/or guardian has been advised of the potential risks to privacy not withstanding such measures.     You have chosen to receive care through a video visit today. Do you consent to use a video visit for your medical care today? Pt agrees.   This visit has been scheduled as a video visit to comply with patient safety concerns in accordance with CDC recommendations.      History of Present Illness:   Roby Marvin is a 33 y.o. male who is being seen today for individual session. Pt describes struggling with ADHD and depression. Pt states that he is living with his mother and is not working and hasn't since  2018 when he lost his job. Pt says that he feels unmotivated and struggles with all things that require movement. Pt describes everything that he does requires energy is difficult for him. Pt reports that he also have a very difficult time sleeping and with his irritability level. Pt started taking Strattera for a month and a half and reports that this is somewhat helpful with his motivation. Pt mentioned the possibility of Adderall however the requirements of this were strict and hard for someone that does not have reliable or independent transportation. Pt describes feeling disappointed as he is not where he expected to be at his age and this is difficult for him. Pt states that he would like to get a job however does seem to struggle with the idea of it due to the barriers that he has. Pt does identify his main cooping skill being playing video games. Pt denies any SI/HI.     Objective       Mental Status Exam:   MENTAL STATUS EXAM   General Appearance:  Cleanly groomed and dressed  Eye Contact:  Good eye contact  Attitude:  Cooperative  Motor Activity:  Normal gait, posture  Muscle Strength:  Normal  Speech:  Normal rate, tone, volume  Mood and affect:  Normal, pleasant  Hopelessness:  Denies  Loneliness: Denies  Thought Process:  Logical  Associations/ Thought Content:  No delusions  Hallucinations:  None  Suicidal Ideations:  Not present  Homicidal Ideation:  Not present  Sensorium:  Alert  Orientation:  Person, situation, time and place  Immediate Recall, Recent, and Remote Memory:  Intact  Attention Span/ Concentration:  Good  Fund of Knowledge:  Appropriate for age and educational level  Intellectual Functioning:  Average range  Insight:  Good  Judgement:  Good  Reliability:  Good  Impulse Control:  Good      SUICIDE RISK ASSESSMENT/CSSRS  1. Does patient have thoughts of suicide? no  2. Does patient have intent for suicide? no  3. Does patient have a current plan for suicide? no  4. History of suicide  attempts: no  5. Family history of suicide or attempts: no  6. History of violent behaviors towards others or property or thoughts of committing suicide: no  7. History of sexual aggression toward others: no  8. Access to firearms or weapons: no    Assessment / Plan      Visit Diagnosis/Orders Placed This Visit:    ICD-10-CM ICD-9-CM   1. Depression, unspecified depression type  F32.A 311   2. Attention deficit hyperactivity disorder (ADHD), predominantly inattentive type  F90.0 314.00        PLAN:    Progress toward goal: Not at goal    Prognosis: Good with ongoing treatment    Safety: Patient denies SI/HI.  This is patient's first session.  Therapist and patient reviewed options for help including call 911, 428 the suicide hotline, and going to the neared ER.  Therapist will continue to monitor.   Risk Assessment: Risk of self-harm acutely is low. Risk of self-harm chronically is also low, but could be further elevated in the event of treatment noncompliance and/or AODA.    Treatment Plan/Goals: Continue supportive psychotherapy efforts and medications as indicated. Treatment and medication options discussed during today's visit. Patient acknowledged and verbally consented to continue with current treatment plan and was educated on the importance of compliance with treatment and follow-up appointments. Patient seems reasonably able to adhere to treatment plan.      Assisted Patient in processing above session content; acknowledged and normalized patient’s thoughts, feelings, and concerns.     Allowed Patient to freely discuss issues  without interruption or judgement with unconditional positive regard, active listening skills, and empathy. Therapist provided a safe, confidential environment to facilitate the development of a positive therapeutic relationship and encouraged open, honest communication. Assisted Patient in identifying risk factors which would indicate the need for higher level of care including  thoughts to harm self or others and/or self-harming behavior and encouraged Patient to contact this office, call 911, or present to the nearest emergency room should any of these events occur. Discussed crisis intervention services and means to access. Patient adamantly and convincingly denies current suicidal or homicidal ideation or perceptual disturbance. Assisted Patient in processing session content; acknowledged and normalized Patient’s thoughts, feelings, and concerns by utilizing a person-centered approach in efforts to build appropriate rapport and a positive therapeutic relationship with open and honest communication.     Follow Up:   Return in about 4 weeks (around 5/31/2024) for Video visit, therapy session.         This document has been electronically signed by Annabel Phelps LCSW  May 3, 2024 10:19 EDT

## 2024-05-20 ENCOUNTER — TELEMEDICINE (OUTPATIENT)
Dept: PSYCHIATRY | Facility: CLINIC | Age: 34
End: 2024-05-20
Payer: COMMERCIAL

## 2024-05-20 DIAGNOSIS — F90.0 ADHD, PREDOMINANTLY INATTENTIVE TYPE: Primary | ICD-10-CM

## 2024-05-20 DIAGNOSIS — F34.1 PERSISTENT DEPRESSIVE DISORDER WITH ANXIOUS DISTRESS, CURRENTLY MODERATE: ICD-10-CM

## 2024-05-20 DIAGNOSIS — F41.1 GENERALIZED ANXIETY DISORDER: ICD-10-CM

## 2024-05-20 PROCEDURE — 99214 OFFICE O/P EST MOD 30 MIN: CPT | Performed by: NURSE PRACTITIONER

## 2024-05-20 PROCEDURE — 1160F RVW MEDS BY RX/DR IN RCRD: CPT | Performed by: NURSE PRACTITIONER

## 2024-05-20 PROCEDURE — 1159F MED LIST DOCD IN RCRD: CPT | Performed by: NURSE PRACTITIONER

## 2024-05-20 RX ORDER — ATOMOXETINE 60 MG/1
60 CAPSULE ORAL DAILY
Qty: 90 CAPSULE | Refills: 0 | Status: SHIPPED | OUTPATIENT
Start: 2024-05-20

## 2024-05-20 NOTE — PROGRESS NOTES
" This provider is located at [facility], [facility], [address], [address] [facility] in [place] [address], using a secure base[employer]hart Video Visit through [employer]. Patient is being seen remotely via telehealth at their home address in [place] [address], and stated they are in a secure environment for this session. The patient's condition being diagnosed/treated is appropriate for telemedicine. The provider identified herself as well as her credentials. The patient, and/or patients guardian, consent to be seen remotely, and when consent is given they understand that the consent allows for patient identifiable information to be sent to a third party as needed. They may refuse to be seen remotely at any time. The electronic data is encrypted and password protected, and the patient and/or guardian has been advised of the potential risks to privacy not withstanding such measures.     You have chosen to receive care through a telehealth visit.  Do you consent to use a video/audio connection for your medical care today? Yes    Subjective   Roby Marvin is a 33 y.o. male who presents today for follow up    Chief Complaint:  poor concentration, anxiety and depression    History of Present Illness:   History of Present Illness  Roby Marivn presents today via MyChart radio visit for medication management follow-up.  Currently taking fluoxetine 40 mg daily along with Strattera 40 mg daily. Has noticed improvement in anxiety since adding Strattera to medication regimen.  Racing thoughts have decreased along with constant over thinking situations.  Had initially felt that motivation was improved as he was able to be more active and maintain a routine. Motivation lasted for only a short time and is now back to feeling \"burnout.\"  Has not been able to maintain routine or stay on any type of schedule.  Also struggles with attention and becomes easily distracted.  Feeling more down, depressed, low energy.  Feels " that some of the depressive symptoms could be situational.  Sleeping well, sometimes stays up later that results in sleeping later in day.  Denies any issues with appetite.  Adamantly denies any current SI/HI.  Had initial visit for therapy and feels that this will be beneficial.  PHQ-9 total Score: 14 (previously 14), TESSIE-7 total Score: 9 (previously 15).    Previous Psych Meds: Currently taking fluoxetine (since 7/25/2023) prescribed by PCP, Wellbutrin SR    The following portions of the patient's history were reviewed and updated as appropriate: allergies, current medications, past family history, past medical history, past social history, past surgical history and problem list.      Past Medical History:  Past Medical History:   Diagnosis Date    ADHD 5/3/2024    Anxiety ~07/25/23    Depression ~07/25/23    Liver disease 10/17/23    Diagnosed with having fatty liver deposits, not sure if this falls under same category.       Social History:  Social History     Socioeconomic History    Marital status: Single   Tobacco Use    Smoking status: Never     Passive exposure: Yes    Smokeless tobacco: Never    Tobacco comments:     Social smoker, never habitual   Vaping Use    Vaping status: Never Used   Substance and Sexual Activity    Alcohol use: Not Currently     Comment: Social drinker, never habitual    Drug use: Not Currently     Types: Marijuana     Comment: Smoked marijuana socially, never habitual    Sexual activity: Not Currently     Partners: Female     Birth control/protection: Condom, None, Birth control pill       Family History:  Family History   Problem Relation Age of Onset    Drug abuse Brother     Drug abuse Brother     Seizures Cousin        Past Surgical History:  History reviewed. No pertinent surgical history.    Problem List:  Patient Active Problem List   Diagnosis    ADHD    Depression       Allergy:   Allergies   Allergen Reactions    Banana Itching and Nausea And Vomiting     Itchy throat         Current Medications:   Current Outpatient Medications   Medication Sig Dispense Refill    allopurinol (ZYLOPRIM) 300 MG tablet Take 1 tablet by mouth Daily.      atomoxetine (STRATTERA) 60 MG capsule Take 1 capsule by mouth Daily. 90 capsule 0    FLUoxetine (PROzac) 40 MG capsule Take 1 capsule by mouth Daily. 90 capsule 0     No current facility-administered medications for this visit.     Review of Systems   Constitutional:  Negative for activity change, appetite change, unexpected weight gain and unexpected weight loss.   Respiratory:  Negative for shortness of breath.    Cardiovascular:  Negative for chest pain.   Psychiatric/Behavioral:  Positive for decreased concentration and depressed mood. Negative for sleep disturbance and suicidal ideas. The patient is nervous/anxious.      Physical Exam  Constitutional:       General: He is not in acute distress.     Appearance: Normal appearance.   Neurological:      Mental Status: He is alert.     Vitals:   The patient was seen remotely today via a MyChart Video Visit through [facility].  Unable to obtain vital signs due to nature of remote visit.    Mental Status Exam:   Hygiene:    Appears good  Cooperation:  Cooperative  Eye Contact:   CATHI r/t video visit  Psychomotor Behavior:  Appropriate  Affect:  Full range and Appropriate  Mood: normal  Hopelessness: Denies  Speech:  Normal  Thought Process:  Goal directed and Linear  Thought Content:  Mood congruent  Suicidal:  None  Homicidal:  None  Hallucinations:  None  Delusion:  None  Memory:  Intact  Orientation:  Person, Place, Time, and Situation  Reliability:  good  Insight:  Fair  Judgement:  Fair  Impulse Control:  Good    Assessment & Plan   Problems Addressed this Visit    None  Visit Diagnoses       ADHD, predominantly inattentive type    -  Primary    Relevant Medications    atomoxetine (STRATTERA) 60 MG capsule    Persistent depressive disorder with anxious distress, currently moderate        Relevant  Medications    atomoxetine (STRATTERA) 60 MG capsule    Generalized anxiety disorder        Relevant Medications    atomoxetine (STRATTERA) 60 MG capsule          Diagnoses         Codes Comments    ADHD, predominantly inattentive type    -  Primary ICD-10-CM: F90.0  ICD-9-CM: 314.00     Persistent depressive disorder with anxious distress, currently moderate     ICD-10-CM: F34.1  ICD-9-CM: 300.4     Generalized anxiety disorder     ICD-10-CM: F41.1  ICD-9-CM: 300.02             Visit Diagnoses:    ICD-10-CM ICD-9-CM   1. ADHD, predominantly inattentive type  F90.0 314.00   2. Persistent depressive disorder with anxious distress, currently moderate  F34.1 300.4   3. Generalized anxiety disorder  F41.1 300.02     Roby presents today for medication management follow-up via Keep[employer] video visit.  Has noticed improvement in overall anxiety.  He does continue to struggle with motivation and initiation of tasks.  Has had some situational stressors that likely contribute to increased symptoms of anxiety and depression. Had a initial visit with counseling and feels that this was beneficial.  Discussed plan and medication regimen/options.  Will continue with Prozac 40 mg daily and increase Strattera from 40 to 60 mg daily for continued ADHD symptoms.  Denies any adverse effects of medication regimen.  Encouraged him to schedule future counseling appointments, currently seeing Alina Phelps LCSW.    -Increase Strattera from 40 mg to 60 mg daily  -Continue Prozac 40 mg daily    -Reviewed previous available documentation and no available labs on file. JONO reviewed and is appropriate.    GOALS:  Short Term Goals: Patient will be compliant with medication, and patient will have no significant medication related side effects.  Patient will be engaged in psychotherapy as indicated.  Patient will report subjective improvement of symptoms.  Long term goals: To stabilize mood and treat/improve subjective symptoms, the  patient will stay out of the hospital, the patient will be at an optimal level of functioning, and the patient will take all medications as prescribed.  The patient/guardian verbalized understanding and agreement with goals that were mutually set.    TREATMENT PLAN: Continue supportive psychotherapy efforts and medications as indicated for patient's diagnosis.  Pharmacological and Non-Pharmacological treatment options discussed during today's visit. Patient/Guardian acknowledged and verbally consented with current treatment plan and was educated on the importance of compliance with treatment and follow-up appointments.      MEDICATION ISSUES:  Discussed medication options and treatment plan of prescribed medication as well as the risks, benefits, any black box warnings, and side effects including potential falls, possible impaired driving, and metabolic adversities among others. Patient is agreeable to call the office with any worsening of symptoms or onset of side effects, or if any concerns or questions arise.  The contact information for the office is made available to the patient. Patient is agreeable to call 911 or go to the nearest ER should they begin having any SI/HI, or if any urgent concerns arise. No medication side effects or related complaints today.     MEDS ORDERED DURING VISIT:  New Medications Ordered This Visit   Medications    atomoxetine (STRATTERA) 60 MG capsule     Sig: Take 1 capsule by mouth Daily.     Dispense:  90 capsule     Refill:  0       FOLLOW UP:  Return in about 8 weeks (around 7/15/2024) for Recheck.    I spent 27 minutes caring for Roby on this date of service. This time includes time spent by me in the following activities: preparing for the visit, obtaining and/or reviewing a separately obtained history, performing a medically appropriate examination and/or evaluation, counseling and educating the patient/family/caregiver, ordering medications, tests, or procedures and  documenting information in the medical record.           This document has been electronically signed by NICCI Gray  May 20, 2024 12:27 EDT    Please note that portions of this note were completed with a voice recognition program. Efforts were made to edit dictation, but occasionally words are mistranscribed.

## 2024-05-29 ENCOUNTER — TELEMEDICINE (OUTPATIENT)
Dept: PSYCHIATRY | Facility: CLINIC | Age: 34
End: 2024-05-29
Payer: COMMERCIAL

## 2024-05-29 DIAGNOSIS — F33.2 SEVERE EPISODE OF RECURRENT MAJOR DEPRESSIVE DISORDER, WITHOUT PSYCHOTIC FEATURES: ICD-10-CM

## 2024-05-29 DIAGNOSIS — F90.0 ATTENTION DEFICIT HYPERACTIVITY DISORDER (ADHD), PREDOMINANTLY INATTENTIVE TYPE: Primary | ICD-10-CM

## 2024-05-29 NOTE — PROGRESS NOTES
Video Visit Adult       Date Encounter: 2024   Name: Roby Marvin  MRN: [id number]  : 1990    Time In: 4:00 PM  Time Out: 4:57 PM     Referring Provider: Xuan Borrero MD    Chief Complaint:      ICD-10-CM ICD-9-CM   1. Attention deficit hyperactivity disorder (ADHD), predominantly inattentive type  F90.0 314.00   2. Severe episode of recurrent major depressive disorder, without psychotic features  F33.2 296.33        The provider is located at [facility] [place] [facility] [address], [address], [place], [place]. using a secure [employer] Video Visit through [employer] for assessment with Annabel Sterling LCSW, MSW.  The Patient is seen remotely at their home address in [place], using a private computer, phone or tablet.  Patient is being seen remotely via telehealth at home address in [place] and stated they are in a secure environment for this session using [facility] Chart. The patient's condition being diagnosed/treated is appropriate for telemedicine. The provider identified self as well as credentials. The patient, and/or patients guardian, consent to be seen remotely, and when consent is given they understand that the consent allows for patient identifiable information to be sent to a third party as needed. They may refuse to be seen remotely at any time. The electronic data is encrypted and password protected, and the patient and/or guardian has been advised of the potential risks to privacy not withstanding such measures.     You have chosen to receive care through a video visit today. Do you consent to use a video visit for your medical care today? Pt agrees.   This visit has been scheduled as a video visit to comply with patient safety concerns in accordance with CDC recommendations.      History of Present Illness:   Roby Marvin is a 33 y.o. male who is being seen today for individual session. Pt discussed feeling somewhat down or indifferent. Pt describes being able to notice a  small change in his focus (ADHD) while taking Strattera. Pt demonstrates a desire to work and be making his own money however this Is difficult currently as he is still adjusting to medication. Pt and LCSW did a short review of any adverse events in his childhood. LCSW and pt also discussed his expectations and his plans for the future. Pt describes not being incredibly interested in getting  and does not want children. Pt and LCSW also discussed that he does struggle with trust issues that stem from partners of his being unfaithful. Pt does still seem depressed and has dark circles under his eyes. Pt denies any SI/HI.       Subjective     Medications:     Current Outpatient Medications:     allopurinol (ZYLOPRIM) 300 MG tablet, Take 1 tablet by mouth Daily., Disp: , Rfl:     atomoxetine (STRATTERA) 60 MG capsule, Take 1 capsule by mouth Daily., Disp: 90 capsule, Rfl: 0    FLUoxetine (PROzac) 40 MG capsule, Take 1 capsule by mouth Daily., Disp: 90 capsule, Rfl: 0    Allergies:   Allergies   Allergen Reactions    Banana Itching and Nausea And Vomiting     Itchy throat        Objective       Mental Status Exam:   MENTAL STATUS EXAM   General Appearance:  Cleanly groomed and dressed  Eye Contact:  Good eye contact  Attitude:  Cooperative  Motor Activity:  Normal gait, posture  Muscle Strength:  Normal  Speech:  Normal rate, tone, volume  Mood and affect:  Normal, pleasant  Hopelessness:  Denies  Loneliness: Denies  Thought Process:  Logical  Associations/ Thought Content:  No delusions  Hallucinations:  None  Suicidal Ideations:  Not present  Homicidal Ideation:  Not present  Sensorium:  Alert  Orientation:  Person, place, time and situation  Immediate Recall, Recent, and Remote Memory:  Intact  Attention Span/ Concentration:  Good  Fund of Knowledge:  Appropriate for age and educational level  Intellectual Functioning:  Average range  Insight:  Good  Judgement:  Good  Reliability:  Good  Impulse Control:   Good      SUICIDE RISK ASSESSMENT/CSSRS  1. Does patient have thoughts of suicide? no  2. Does patient have intent for suicide? no  3. Does patient have a current plan for suicide? no  4. History of suicide attempts: no  5. Family history of suicide or attempts: no  6. History of violent behaviors towards others or property or thoughts of committing suicide: no  7. History of sexual aggression toward others: no  8. Access to firearms or weapons: no    Assessment / Plan      Visit Diagnosis/Orders Placed This Visit:    ICD-10-CM ICD-9-CM   1. Attention deficit hyperactivity disorder (ADHD), predominantly inattentive type  F90.0 314.00   2. Severe episode of recurrent major depressive disorder, without psychotic features  F33.2 296.33        PLAN:    Progress toward goal: Not at goal    Prognosis: Good with ongoing treatment    Safety: Patient denies SI/HI.  This is patient's second session.  Therapist and patient reviewed options for help including call 911, 988 the suicide hotline, and going to the neared ER.  Therapist will continue to monitor.   Risk Assessment: Risk of self-harm acutely is low. Risk of self-harm chronically is also low, but could be further elevated in the event of treatment noncompliance and/or AODA.    Treatment Plan/Goals: Continue supportive psychotherapy efforts and medications as indicated. Treatment and medication options discussed during today's visit. Patient acknowledged and verbally consented to continue with current treatment plan and was educated on the importance of compliance with treatment and follow-up appointments. Patient seems reasonably able to adhere to treatment plan.      Assisted Patient in processing above session content; acknowledged and normalized patient’s thoughts, feelings, and concerns.     Allowed Patient to freely discuss issues  without interruption or judgement with unconditional positive regard, active listening skills, and empathy. Therapist provided a safe,  confidential environment to facilitate the development of a positive therapeutic relationship and encouraged open, honest communication. Assisted Patient in identifying risk factors which would indicate the need for higher level of care including thoughts to harm self or others and/or self-harming behavior and encouraged Patient to contact this office, call 911, or present to the nearest emergency room should any of these events occur. Discussed crisis intervention services and means to access. Patient adamantly and convincingly denies current suicidal or homicidal ideation or perceptual disturbance. Assisted Patient in processing session content; acknowledged and normalized Patient’s thoughts, feelings, and concerns by utilizing a person-centered approach in efforts to build appropriate rapport and a positive therapeutic relationship with open and honest communication.     Follow Up:   Return in about 1 week (around 6/5/2024) for therapy session, Video visit.         This document has been electronically signed by Annabel Phelps LCSW  May 29, 2024 16:58 EDT

## 2024-06-26 ENCOUNTER — TELEMEDICINE (OUTPATIENT)
Dept: PSYCHIATRY | Facility: CLINIC | Age: 34
End: 2024-06-26
Payer: COMMERCIAL

## 2024-06-26 DIAGNOSIS — F33.2 SEVERE EPISODE OF RECURRENT MAJOR DEPRESSIVE DISORDER, WITHOUT PSYCHOTIC FEATURES: ICD-10-CM

## 2024-06-26 DIAGNOSIS — F90.0 ATTENTION DEFICIT HYPERACTIVITY DISORDER (ADHD), PREDOMINANTLY INATTENTIVE TYPE: Primary | ICD-10-CM

## 2024-06-26 PROCEDURE — 90834 PSYTX W PT 45 MINUTES: CPT

## 2024-06-26 NOTE — PROGRESS NOTES
"    Follow Up Video Visit     Date: 2024   Patient Name: Roby Marvin  : 1990   MRN: [id number]   Time In: 2:00 PM      Time Out: 2:45 PM     Referring Physician: Xuan Borrero MD    Chief Complaint:      ICD-10-CM ICD-9-CM   1. Attention deficit hyperactivity disorder (ADHD), predominantly inattentive type  F90.0 314.00   2. Severe episode of recurrent major depressive disorder, without psychotic features  F33.2 296.33        The provider is located at [facility] [address], [address], [place], [place]. using a secure [employer] Video Visit through [employer] for assessment with Annabel Sterling LCSW, MSW.  The Patient is seen remotely at their home address in [place], using a private computer, phone or tablet.  Patient is being seen remotely via telehealth at home address in [place] and stated they are in a secure environment for this session using [facility] Chart. The patient's condition being diagnosed/treated is appropriate for telemedicine. The provider identified self as well as credentials. The patient, and/or patients guardian, consent to be seen remotely, and when consent is given they understand that the consent allows for patient identifiable information to be sent to a third party as needed. They may refuse to be seen remotely at any time. The electronic data is encrypted and password protected, and the patient and/or guardian has been advised of the potential risks to privacy not withstanding such measures.     Patient has chosen to receive care through a telehealth video visit and consents to use an audio/video connection for care today? Pt agrees   This visit has been scheduled as a video visit to comply with patient safety concerns in accordance with CDC recommendations.    History of Present Illness: Roby Marvin is a 34 y.o. male presents via video visit today for individual session. Pt describes feeling as if he \"is experiencing a emotional numbness.\" Pt " reports that when he was on Wellbutrin and Prozac, he felt very petersen. Pt reports that now he is more relaxed, however he is more laid back. Pt says that he does still lack motivation. Pt reports that he is focusing much better than previously but struggles with his memory and forgetting things. LCSW encouraged pt to discuss this with Sho during his meetings with her. Pt also reports that he does experience a lot of moments with de-realization and LCSW and pt processed this. Pt wants to discuss depressive episodes more in depth at next session. Pt denies SI/HI.    Subjective      Medications:     Current Outpatient Medications:     allopurinol (ZYLOPRIM) 300 MG tablet, Take 1 tablet by mouth Daily., Disp: , Rfl:     atomoxetine (STRATTERA) 60 MG capsule, Take 1 capsule by mouth Daily., Disp: 90 capsule, Rfl: 0    FLUoxetine (PROzac) 40 MG capsule, Take 1 capsule by mouth Daily., Disp: 90 capsule, Rfl: 0      Objective     Mental Status Exam:   MENTAL STATUS EXAM   General Appearance:  Cleanly groomed and dressed  Eye Contact:  Good eye contact  Attitude:  Cooperative  Motor Activity:  Normal gait, posture  Muscle Strength:  Normal  Speech:  Normal rate, tone, volume  Mood and affect:  Normal, pleasant  Hopelessness:  Denies  Loneliness: Denies  Thought Process:  Logical  Associations/ Thought Content:  No delusions  Hallucinations:  None  Suicidal Ideations:  Not present  Homicidal Ideation:  Not present  Sensorium:  Alert  Orientation:  Person, place, situation and time  Immediate Recall, Recent, and Remote Memory:  Intact  Attention Span/ Concentration:  Good  Fund of Knowledge:  Appropriate for age and educational level  Intellectual Functioning:  Average range  Insight:  Good  Judgement:  Good  Reliability:  Good  Impulse Control:  Good       Assessment / Plan      Visit Diagnosis/Orders Placed This Visit:    ICD-10-CM ICD-9-CM   1. Attention deficit hyperactivity disorder (ADHD), predominantly inattentive type   F90.0 314.00   2. Severe episode of recurrent major depressive disorder, without psychotic features  F33.2 296.33        PLAN:    Progress toward goal: Not at goal    Prognosis: Good with ongoing treatment    PLAN:  Safety: Patient denies SI/HI.  This is patient's fourth session.  Therapist and patient reviewed options for help including call 911, 988 the suicide hotline, and going to the neared ER.  Therapist will continue to monitor.   Risk Assessment: Risk of self-harm acutely is low. Risk of self-harm chronically is also low, but could be further elevated in the event of treatment noncompliance and/or AODA.     TREATMENT PLAN/GOALS: Continue supportive psychotherapy efforts and medications as indicated. Treatment and medication options discussed during today's visit. Patient ackowledged and verbally consented to continue with current treatment plan and was educated on the importance of compliance with treatment and follow-up appointments. Patient seems reasonably able to adhere to treatment plan.      Allowed Patient to freely discuss issues  without interruption or judgement with unconditional positive regard, active listening skills, and empathy. Therapist provided a safe, confidential environment to facilitate the development of a positive therapeutic relationship and encouraged open, honest communication. Assisted Patient in identifying risk factors which would indicate the need for higher level of care including thoughts to harm self or others and/or self-harming behavior and encouraged Patient to contact this office, call 911, or present to the nearest emergency room should any of these events occur. Discussed crisis intervention services and means to access. Patient adamantly and convincingly denies current suicidal or homicidal ideation or perceptual disturbance. Assisted Patient in processing session content; acknowledged and normalized Patient’s thoughts, feelings, and concerns by utilizing a  person-centered approach in efforts to build appropriate rapport and a positive therapeutic relationship with open and honest communication.     Follow Up:   No follow-ups on file.        This document has been electronically signed by Annabel Phelps LCSW  June 26, 2024 14:57 EDT

## 2024-07-03 ENCOUNTER — TELEMEDICINE (OUTPATIENT)
Dept: PSYCHIATRY | Facility: CLINIC | Age: 34
End: 2024-07-03
Payer: COMMERCIAL

## 2024-07-03 DIAGNOSIS — F33.2 SEVERE EPISODE OF RECURRENT MAJOR DEPRESSIVE DISORDER, WITHOUT PSYCHOTIC FEATURES: ICD-10-CM

## 2024-07-03 DIAGNOSIS — F90.0 ATTENTION DEFICIT HYPERACTIVITY DISORDER (ADHD), PREDOMINANTLY INATTENTIVE TYPE: Primary | ICD-10-CM

## 2024-07-03 PROCEDURE — 1160F RVW MEDS BY RX/DR IN RCRD: CPT

## 2024-07-03 PROCEDURE — 1159F MED LIST DOCD IN RCRD: CPT

## 2024-07-03 PROCEDURE — 90837 PSYTX W PT 60 MINUTES: CPT

## 2024-07-03 NOTE — PROGRESS NOTES
Follow Up Video Visit     Date: 2024   Patient Name: Roby Marvin  : 1990   MRN: [id number]   Time In: 3 :00 PM     Time Out: 3:59 PM     Referring Physician: Xuan Borrero MD    Chief Complaint:      ICD-10-CM ICD-9-CM   1. Attention deficit hyperactivity disorder (ADHD), predominantly inattentive type  F90.0 314.00   2. Severe episode of recurrent major depressive disorder, without psychotic features  F33.2 296.33        The provider is located at [facility] [address], [address], [place], [place]. using a secure [employer] Video Visit through [employer] for assessment with Annabel Sterling LCSW, MSW.  The Patient is seen remotely at their home address in [place], using a private computer, phone or tablet.  Patient is being seen remotely via telehealth at home address in [place] and stated they are in a secure environment for this session using [facility] Chart. The patient's condition being diagnosed/treated is appropriate for telemedicine. The provider identified self as well as credentials. The patient, and/or patients guardian, consent to be seen remotely, and when consent is given they understand that the consent allows for patient identifiable information to be sent to a third party as needed. They may refuse to be seen remotely at any time. The electronic data is encrypted and password protected, and the patient and/or guardian has been advised of the potential risks to privacy not withstanding such measures.     Patient has chosen to receive care through a telehealth video visit and consents to use an audio/video connection for care today?   This visit has been scheduled as a video visit to comply with patient safety concerns in accordance with [facility] recommendations.    History of Present Illness: Roby Marvin is a 34 y.o. male presents via video visit today for individual session. Pt describes feeling as if his ADHD has been better controlled. Pt describes still  feeling depressed and having a real lack of motivation. Pt describes feeling as if his medication has been helpful with any anxiety that he had previously. Pt describes that his biggest issue is lack of motivation.LCSW and pt discussed and identified triggers (people, places, thoughts, things, and emotional states).Pt describes video shashi as his biggest form of escape. Pt denies any SI/HI.       Subjective      Review of Systems:   The following portions of the patient's history were reviewed and updated as appropriate: allergies, current medications, past family history, past medical history, past social history, past surgical history and problem list.     Interval History:   No Change    Patient's Support Network Includes:  mother    Functional Status: Mild impairment     Medications:     Current Outpatient Medications:     allopurinol (ZYLOPRIM) 300 MG tablet, Take 1 tablet by mouth Daily., Disp: , Rfl:     atomoxetine (STRATTERA) 60 MG capsule, Take 1 capsule by mouth Daily., Disp: 90 capsule, Rfl: 0    FLUoxetine (PROzac) 40 MG capsule, Take 1 capsule by mouth Daily., Disp: 90 capsule, Rfl: 0      Objective     Mental Status Exam:   MENTAL STATUS EXAM   General Appearance:  Cleanly groomed and dressed  Eye Contact:  Good eye contact  Attitude:  Cooperative  Motor Activity:  Normal gait, posture  Muscle Strength:  Normal  Speech:  Normal rate, tone, volume  Mood and affect:  Normal, pleasant  Hopelessness:  Denies  Loneliness: Denies  Thought Process:  Logical  Associations/ Thought Content:  No delusions  Hallucinations:  None  Suicidal Ideations:  Not present  Homicidal Ideation:  Not present  Sensorium:  Alert  Orientation:  Place, time, situation and person  Immediate Recall, Recent, and Remote Memory:  Intact  Attention Span/ Concentration:  Good  Fund of Knowledge:  Appropriate for age and educational level  Intellectual Functioning:  Average range  Insight:  Good  Judgement:  Good  Reliability:   Good  Impulse Control:  Good       Assessment / Plan      Visit Diagnosis/Orders Placed This Visit:    ICD-10-CM ICD-9-CM   1. Attention deficit hyperactivity disorder (ADHD), predominantly inattentive type  F90.0 314.00   2. Severe episode of recurrent major depressive disorder, without psychotic features  F33.2 296.33        PLAN:    Progress toward goal: Not at goal    Prognosis: Good with ongoing treatment    PLAN:  Safety: Patient denies SI/HI.  This is patient's seventh session.  Therapist and patient reviewed options for help including call 911, 988 the suicide hotline, and going to the neared ER.  Therapist will continue to monitor.   Risk Assessment: Risk of self-harm acutely is low. Risk of self-harm chronically is also low, but could be further elevated in the event of treatment noncompliance and/or AODA.     TREATMENT PLAN/GOALS: Continue supportive psychotherapy efforts and medications as indicated. Treatment and medication options discussed during today's visit. Patient ackowledged and verbally consented to continue with current treatment plan and was educated on the importance of compliance with treatment and follow-up appointments. Patient seems reasonably able to adhere to treatment plan.      Allowed Patient to freely discuss issues  without interruption or judgement with unconditional positive regard, active listening skills, and empathy. Therapist provided a safe, confidential environment to facilitate the development of a positive therapeutic relationship and encouraged open, honest communication. Assisted Patient in identifying risk factors which would indicate the need for higher level of care including thoughts to harm self or others and/or self-harming behavior and encouraged Patient to contact this office, call 911, or present to the nearest emergency room should any of these events occur. Discussed crisis intervention services and means to access. Patient adamantly and convincingly denies  current suicidal or homicidal ideation or perceptual disturbance. Assisted Patient in processing session content; acknowledged and normalized Patient’s thoughts, feelings, and concerns by utilizing a person-centered approach in efforts to build appropriate rapport and a positive therapeutic relationship with open and honest communication.     Follow Up:   No follow-ups on file.        This document has been electronically signed by Annabel Phelps LCSW  July 3, 2024 15:03 EDT

## 2024-07-22 ENCOUNTER — TELEMEDICINE (OUTPATIENT)
Dept: PSYCHIATRY | Facility: CLINIC | Age: 34
End: 2024-07-22
Payer: COMMERCIAL

## 2024-07-22 DIAGNOSIS — F33.2 SEVERE EPISODE OF RECURRENT MAJOR DEPRESSIVE DISORDER, WITHOUT PSYCHOTIC FEATURES: Primary | ICD-10-CM

## 2024-07-22 DIAGNOSIS — F90.0 ATTENTION DEFICIT HYPERACTIVITY DISORDER (ADHD), PREDOMINANTLY INATTENTIVE TYPE: ICD-10-CM

## 2024-07-22 PROCEDURE — 1160F RVW MEDS BY RX/DR IN RCRD: CPT | Performed by: NURSE PRACTITIONER

## 2024-07-22 PROCEDURE — 99214 OFFICE O/P EST MOD 30 MIN: CPT | Performed by: NURSE PRACTITIONER

## 2024-07-22 PROCEDURE — 1159F MED LIST DOCD IN RCRD: CPT | Performed by: NURSE PRACTITIONER

## 2024-07-22 RX ORDER — FLUOXETINE 10 MG/1
CAPSULE ORAL
Qty: 25 CAPSULE | Refills: 0 | Status: SHIPPED | OUTPATIENT
Start: 2024-07-22 | End: 2024-08-13

## 2024-07-22 NOTE — PROGRESS NOTES
This provider is located at [facility], [address], [address] [facility] in [place] [address], using a secure [employer] Video Visit through [employer]. Patient is being seen remotely via telehealth at their home address in [place] [address], and stated they are in a secure environment for this session. The patient's condition being diagnosed/treated is appropriate for telemedicine. The provider identified herself as well as her credentials. The patient, and/or patients guardian, consent to be seen remotely, and when consent is given they understand that the consent allows for patient identifiable information to be sent to a third party as needed. They may refuse to be seen remotely at any time. The electronic data is encrypted and password protected, and the patient and/or guardian has been advised of the potential risks to privacy not withstanding such measures.     You have chosen to receive care through a telehealth visit.  Do you consent to use a video/audio connection for your medical care today? Yes    Subjective   Roby Marvin is a 34 y.o. male who presents today for follow up    Chief Complaint:  poor concentration, anxiety and depression    History of Present Illness:   History of Present Illness  Roby Marvin presents today for medication management follow-up via MyChart video visit.  Reports increased symptoms of depression over the past 1 to 1.5 months, symptoms have gotten progressively worse over the last 2 weeks.  Denies any known triggers to cause symptom increase.  Reports that he has struggled with overall low mood at baseline. Feels that even when mood is better, still remains at a higher or low level.  Also has low energy, no motivation and irritability.  When irritable, he does feel that he is able to move past those feelings easily.  Has days of lying in bed, only getting up to eat.  Feels that his appetite has changed, tends to go for more comfort type  foods/drinks.  Does not feel that anxiety is as much of a struggle for him as depression.  He does feel anxious on occasion.  Rates overall depression 8/10 on a 0-10 scale with 10 being the worst.  Feels that he sleeps adequate amounts, but sleep schedule varies.  Typically obtains between 7 to 9 hours of sleep on average.  Reports compliance with daily medication, currently taking Strattera 60 mg daily and Prozac 40 mg daily.  Denies any SI/HI.  PHQ-9 total score: 18, TESSIE-7 total score: 8.    Previous Psych Meds: Currently taking fluoxetine (since 7/25/2023) prescribed by PCP, Wellbutrin SR    The following portions of the patient's history were reviewed and updated as appropriate: allergies, current medications, past family history, past medical history, past social history, past surgical history and problem list.      Past Medical History:  Past Medical History:   Diagnosis Date    ADHD 5/3/2024    Anxiety ~07/25/23    Depression ~07/25/23    Liver disease 10/17/23    Diagnosed with having fatty liver deposits, not sure if this falls under same category.       Social History:  Social History     Socioeconomic History    Marital status: Single   Tobacco Use    Smoking status: Never     Passive exposure: Yes    Smokeless tobacco: Never    Tobacco comments:     Social smoker, never habitual   Vaping Use    Vaping status: Never Used   Substance and Sexual Activity    Alcohol use: Not Currently     Comment: Social drinker, never habitual    Drug use: Not Currently     Types: Marijuana     Comment: Smoked marijuana socially, never habitual    Sexual activity: Not Currently     Partners: Female     Birth control/protection: Condom, None, Birth control pill       Family History:  Family History   Problem Relation Age of Onset    Drug abuse Brother     Drug abuse Brother     Seizures Cousin        Past Surgical History:  History reviewed. No pertinent surgical history.    Problem List:  Patient Active Problem List    Diagnosis    ADHD    Depression       Allergy:   Allergies   Allergen Reactions    Banana Itching and Nausea And Vomiting     Itchy throat        Current Medications:   Current Outpatient Medications   Medication Sig Dispense Refill    allopurinol (ZYLOPRIM) 300 MG tablet Take 1 tablet by mouth Daily.      atomoxetine (STRATTERA) 60 MG capsule Take 1 capsule by mouth Daily. 90 capsule 0    FLUoxetine (PROzac) 10 MG capsule Take 2 capsules by mouth Daily for 7 days, THEN 1 capsule Daily for 7 days, THEN 1 capsule Every Other Day for 8 days. 25 capsule 0    Vortioxetine HBr (Trintellix) 5 MG tablet tablet Take 1 tablet by mouth Daily With Breakfast. 90 tablet 0     No current facility-administered medications for this visit.     Review of Systems   Constitutional:  Negative for activity change, appetite change, unexpected weight gain and unexpected weight loss.   Respiratory:  Negative for shortness of breath.    Cardiovascular:  Negative for chest pain.   Psychiatric/Behavioral:  Positive for decreased concentration and depressed mood. Negative for sleep disturbance and suicidal ideas. The patient is nervous/anxious.      Physical Exam  Constitutional:       General: He is not in acute distress.     Appearance: Normal appearance.   Neurological:      Mental Status: He is alert.     Vitals:   The patient was [facility]  Unable to obtain vital signs due to nature of remote visit.    Mental Status Exam:   Hygiene:    Appears good  Cooperation:  Cooperative  Eye Contact:   [facility] r/t video visit  Psychomotor Behavior:  Appropriate  Affect:  Full range and Appropriate  Mood: normal  Hopelessness: Denies  Speech:  Normal  Thought Process:  Goal directed and Linear  Thought Content:  Mood congruent  Suicidal:  None  Homicidal:  None  Hallucinations:  None  Delusion:  None  Memory:  Intact  Orientation:  Person, Place, Time, and Situation  Reliability:  good  Insight:   Fair  Judgement:  Fair  Impulse Control:  Good    Assessment & Plan   Problems Addressed this Visit          Mental Health    ADHD    Relevant Medications    FLUoxetine (PROzac) 10 MG capsule    Vortioxetine HBr (Trintellix) 5 MG tablet tablet    Depression - Primary    Relevant Medications    FLUoxetine (PROzac) 10 MG capsule    Vortioxetine HBr (Trintellix) 5 MG tablet tablet     Diagnoses         Codes Comments    Severe episode of recurrent major depressive disorder, without psychotic features    -  Primary ICD-10-CM: F33.2  ICD-9-CM: 296.33     Attention deficit hyperactivity disorder (ADHD), predominantly inattentive type     ICD-10-CM: F90.0  ICD-9-CM: 314.00             Visit Diagnoses:    ICD-10-CM ICD-9-CM   1. Severe episode of recurrent major depressive disorder, without psychotic features  F33.2 296.33   2. Attention deficit hyperactivity disorder (ADHD), predominantly inattentive type  F90.0 314.00       Roby presents today via MyChart video visit for medication management follow-up.  He feels that his overall focus and concentration have improved since increasing Strattera last visit, but does feel that motivation is an issue.  Having increased episode of depression that has worsened over the last 2 weeks.  Discussed plan of medication regimen, would like to continue with nonstimulant type medication for now and is open to changing Prozac.  We will continue with Strattera 60 mg daily and taper/discontinue Prozac, then initiate Trintellix.  Encouraged him to continue with counseling as scheduled, currently seeing Alina Phelps LCSW.    -Continue Strattera 60 mg daily  -Taper/discontinue Prozac, decrease from 40 mg daily to 20 mg daily x 7 days, then 10 mg daily x 7 days, then 10 mg every other day x 8 days then stop  -Start Trintellix 5 mg daily (will initiate after Prozac taper)    -Reviewed previous available documentation and no available labs on file. JONO reviewed and is  appropriate.    GOALS:  Short Term Goals: Patient will be compliant with medication, and patient will have no significant medication related side effects.  Patient will be engaged in psychotherapy as indicated.  Patient will report subjective improvement of symptoms.  Long term goals: To stabilize mood and treat/improve subjective symptoms, the patient will stay out of the hospital, the patient will be at an optimal level of functioning, and the patient will take all medications as prescribed.  The patient/guardian verbalized understanding and agreement with goals that were mutually set.    TREATMENT PLAN: Continue supportive psychotherapy efforts and medications as indicated for patient's diagnosis.  Pharmacological and Non-Pharmacological treatment options discussed during today's visit. Patient/Guardian acknowledged and verbally consented with current treatment plan and was educated on the importance of compliance with treatment and follow-up appointments.      MEDICATION ISSUES:  Discussed medication options and treatment plan of prescribed medication as well as the risks, benefits, any black box warnings, and side effects including potential falls, possible impaired driving, and metabolic adversities among others. Patient is agreeable to call the office with any worsening of symptoms or onset of side effects, or if any concerns or questions arise.  The contact information for the office is made available to the patient. Patient is agreeable to call 911 or go to the nearest ER should they begin having any SI/HI, or if any urgent concerns arise. No medication side effects or related complaints today.     MEDS ORDERED DURING VISIT:  New Medications Ordered This Visit   Medications    FLUoxetine (PROzac) 10 MG capsule     Sig: Take 2 capsules by mouth Daily for 7 days, THEN 1 capsule Daily for 7 days, THEN 1 capsule Every Other Day for 8 days.     Dispense:  25 capsule     Refill:  0    Vortioxetine HBr  (Trintellix) 5 MG tablet tablet     Sig: Take 1 tablet by mouth Daily With Breakfast.     Dispense:  90 tablet     Refill:  0       FOLLOW UP:  Return in about 6 weeks (around 9/2/2024) for Recheck, Video visit.    I spent 30 minutes caring for Roby on this date of service. This time includes time spent by me in the following activities: preparing for the visit, obtaining and/or reviewing a separately obtained history, performing a medically appropriate examination and/or evaluation, counseling and educating the patient/family/caregiver, ordering medications, tests, or procedures and documenting information in the medical record.           This document has been electronically signed by NICCI Gray  July 22, 2024 11:15 EDT    Please note that portions of this note were completed with a voice recognition program. Efforts were made to edit dictation, but occasionally words are mistranscribed.

## 2024-07-24 ENCOUNTER — TELEMEDICINE (OUTPATIENT)
Dept: PSYCHIATRY | Facility: CLINIC | Age: 34
End: 2024-07-24
Payer: COMMERCIAL

## 2024-07-24 DIAGNOSIS — F33.2 SEVERE EPISODE OF RECURRENT MAJOR DEPRESSIVE DISORDER, WITHOUT PSYCHOTIC FEATURES: ICD-10-CM

## 2024-07-24 DIAGNOSIS — F90.0 ATTENTION DEFICIT HYPERACTIVITY DISORDER (ADHD), PREDOMINANTLY INATTENTIVE TYPE: Primary | ICD-10-CM

## 2024-07-24 NOTE — PROGRESS NOTES
Follow Up Video Visit     Date: 2024   Patient Name: Roby Marvin  : 1990   MRN: [id number]   Time In: 2:02 pm      Time Out: 2:59 PM     Referring Physician: Xuan Borrero MD    Chief Complaint:      ICD-10-CM ICD-9-CM   1. Attention deficit hyperactivity disorder (ADHD), predominantly inattentive type  F90.0 314.00   2. Severe episode of recurrent major depressive disorder, without psychotic features  F33.2 296.33        The provider is located at [facility] [address], [address], [place], [place]. using a secure [employer] Video Visit through [employer] for assessment with Annabel Sterling LCSW, MSW.  The Patient is seen remotely at their home address in [place], using a private computer, phone or tablet.  Patient is being seen remotely via telehealth at home address in [place] and stated they are in a secure environment for this session using [facility] Chart. The patient's condition being diagnosed/treated is appropriate for telemedicine. The provider identified self as well as credentials. The patient, and/or patients guardian, consent to be seen remotely, and when consent is given they understand that the consent allows for patient identifiable information to be sent to a third party as needed. They may refuse to be seen remotely at any time. The electronic data is encrypted and password protected, and the patient and/or guardian has been advised of the potential risks to privacy not withstanding such measures.     Patient has chosen to receive care through a telehealth video visit and consents to use an audio/video connection for care today?   This visit has been scheduled as a video visit to comply with patient safety concerns in accordance with [facility] recommendations.    History of Present Illness: Roby Marvin is a 34 y.o. male presents via video visit today for individual session. Pt describes not sleeping well. Pt reports that he has canceled the last two  appointments due to not sleeping well. Pt talked about his issues with de-realization and when thinking about when this started and was unable to pinpoint a timeframe or timeline. Pt described the impact losing his job and partner in the same day, had on him. Pt describes feeling somewhat stunted since this time frame where everything he was working for what gone. Pt's intense loss of motivation came from these events. LCSW and pt explored some of the things he had seen for himself such as [employer] school, video editing or even a career in IT related field. Pt started Trintellix and is hopeful that this new medication is helpful with his depression. Pt denies any SI/HI.       Subjective      Review of Systems:   The following portions of the patient's history were reviewed and updated as appropriate: allergies, current medications, past family history, past medical history, past social history, past surgical history and problem list.     Interval History:   No Change    Patient's Support Network Includes:  mother    Functional Status: Moderate impairment     Medications:     Current Outpatient Medications:     allopurinol (ZYLOPRIM) 300 MG tablet, Take 1 tablet by mouth Daily., Disp: , Rfl:     atomoxetine (STRATTERA) 60 MG capsule, Take 1 capsule by mouth Daily., Disp: 90 capsule, Rfl: 0    FLUoxetine (PROzac) 10 MG capsule, Take 2 capsules by mouth Daily for 7 days, THEN 1 capsule Daily for 7 days, THEN 1 capsule Every Other Day for 8 days., Disp: 25 capsule, Rfl: 0    Vortioxetine HBr (Trintellix) 5 MG tablet tablet, Take 1 tablet by mouth Daily With Breakfast., Disp: 90 tablet, Rfl: 0      Objective     Mental Status Exam:   MENTAL STATUS EXAM   General Appearance:  Cleanly groomed and dressed  Eye Contact:  Good eye contact  Attitude:  Cooperative  Motor Activity:  Normal gait, posture  Muscle Strength:  Normal  Speech:  Normal rate, tone, volume  Mood and affect:  Normal, pleasant  Hopelessness:   Denies  Loneliness: Denies  Thought Process:  Logical  Associations/ Thought Content:  No delusions  Hallucinations:  None  Suicidal Ideations:  Not present  Homicidal Ideation:  Not present  Sensorium:  Alert  Orientation:  Person  Immediate Recall, Recent, and Remote Memory:  Intact  Attention Span/ Concentration:  Good  Fund of Knowledge:  Appropriate for age and educational level  Intellectual Functioning:  Average range  Insight:  Good  Judgement:  Good  Reliability:  Good  Impulse Control:  Good       Assessment / Plan      Visit Diagnosis/Orders Placed This Visit:    ICD-10-CM ICD-9-CM   1. Attention deficit hyperactivity disorder (ADHD), predominantly inattentive type  F90.0 314.00   2. Severe episode of recurrent major depressive disorder, without psychotic features  F33.2 296.33        PLAN:    Progress toward goal: Not at goal    Prognosis: Good with ongoing treatment    PLAN:  Safety: No acute safety concerns.  Therapist will continue to monitor.    Risk Assessment: Risk of self-harm acutely is low. Risk of self-harm chronically is also low, but could be further elevated in the event of treatment noncompliance and/or AODA.     TREATMENT PLAN/GOALS: Continue supportive psychotherapy efforts and medications as indicated. Treatment and medication options discussed during today's visit. Patient ackowledged and verbally consented to continue with current treatment plan and was educated on the importance of compliance with treatment and follow-up appointments. Patient seems reasonably able to adhere to treatment plan.      Allowed Patient to freely discuss issues  without interruption or judgement with unconditional positive regard, active listening skills, and empathy. Therapist provided a safe, confidential environment to facilitate the development of a positive therapeutic relationship and encouraged open, honest communication. Assisted Patient in identifying risk factors which would indicate the need for  higher level of care including thoughts to harm self or others and/or self-harming behavior and encouraged Patient to contact this office, call 911, or present to the nearest emergency room should any of these events occur. Discussed crisis intervention services and means to access. Patient adamantly and convincingly denies current suicidal or homicidal ideation or perceptual disturbance. Assisted Patient in processing session content; acknowledged and normalized Patient’s thoughts, feelings, and concerns by utilizing a person-centered approach in efforts to build appropriate rapport and a positive therapeutic relationship with open and honest communication.     Follow Up:   No follow-ups on file.        This document has been electronically signed by Annabel Phelps LCSW  July 24, 2024 14:04 EDT

## 2024-07-31 ENCOUNTER — TELEMEDICINE (OUTPATIENT)
Dept: PSYCHIATRY | Facility: CLINIC | Age: 34
End: 2024-07-31
Payer: COMMERCIAL

## 2024-07-31 DIAGNOSIS — F33.2 SEVERE EPISODE OF RECURRENT MAJOR DEPRESSIVE DISORDER, WITHOUT PSYCHOTIC FEATURES: Primary | ICD-10-CM

## 2024-07-31 NOTE — PROGRESS NOTES
Follow Up Video Visit     Date: 2024   Patient Name: Roby Marvin  : 1990   MRN: [id number]   Time In: 2:00 PM      Time Out: 2:59 pm    Referring Physician: Xuan Borrero MD    Chief Complaint:      ICD-10-CM ICD-9-CM   1. Severe episode of recurrent major depressive disorder, without psychotic features  F33.2 296.33        The provider is located at [facility], [address], [address], [place], [place]. using a secure Fluencyt Video Visit through [employer] for assessment with Annabel Sterling LCSW, MSW.  The Patient is seen remotely at their home address in [place], using a private computer, phone or tablet.  Patient is being seen remotely via telehealth at home address in [place] and stated they are in a secure environment for this session using [facility] Chart. The patient's condition being diagnosed/treated is appropriate for telemedicine. The provider identified self as well as credentials. The patient, and/or patients guardian, consent to be seen remotely, and when consent is given they understand that the consent allows for patient identifiable information to be sent to a third party as needed. They may refuse to be seen remotely at any time. The electronic data is encrypted and password protected, and the patient and/or guardian has been advised of the potential risks to privacy not withstanding such measures.     Patient has chosen to receive care through a telehealth video visit and consents to use an audio/video connection for care today?   This visit has been scheduled as a video visit to comply with patient safety concerns in accordance with CDC recommendations.    History of Present Illness: Roby Marvin is a 34 y.o. male presents via video visit today for individual session. Pt describes that he is struggling with the feelings of his experiences with death of those that he loves. Pt believes that this has contributed to his ongoing depression and anxiety. Pt  does not feel like there are any specifics in general other than these deaths that have led to his ongoing gasca with depression and anxiety. Pt and LCSW talked in depth about other potential issues or factors. Pt reports that he does feel better somewhat and is considering switching to Bi-Weekly. LCSW agreed with pt that this is a good idea and I am supportive of this. Pt and LCSW reviewed coping skills and pt denies any SI/HI.       Subjective      Review of Systems:   The following portions of the patient's history were reviewed and updated as appropriate: allergies, current medications, past family history, past medical history, past social history, past surgical history and problem list.     Interval History:   No Change    Patient's Support Network Includes:  mother    Functional Status: Mild impairment     Medications:     Current Outpatient Medications:     allopurinol (ZYLOPRIM) 300 MG tablet, Take 1 tablet by mouth Daily., Disp: , Rfl:     atomoxetine (STRATTERA) 60 MG capsule, Take 1 capsule by mouth Daily., Disp: 90 capsule, Rfl: 0    FLUoxetine (PROzac) 10 MG capsule, Take 2 capsules by mouth Daily for 7 days, THEN 1 capsule Daily for 7 days, THEN 1 capsule Every Other Day for 8 days., Disp: 25 capsule, Rfl: 0    Vortioxetine HBr (Trintellix) 5 MG tablet tablet, Take 1 tablet by mouth Daily With Breakfast., Disp: 90 tablet, Rfl: 0      Objective     Mental Status Exam:   MENTAL STATUS EXAM   General Appearance:  Cleanly groomed and dressed  Eye Contact:  Good eye contact  Attitude:  Cooperative  Motor Activity:  Normal gait, posture  Muscle Strength:  Normal  Speech:  Normal rate, tone, volume  Mood and affect:  Normal, pleasant  Hopelessness:  Denies  Loneliness: Denies  Thought Process:  Logical  Associations/ Thought Content:  No delusions  Hallucinations:  None  Suicidal Ideations:  Not present  Homicidal Ideation:  Not present  Sensorium:  Alert  Orientation:  Person, place, time and  situation  Immediate Recall, Recent, and Remote Memory:  Intact  Attention Span/ Concentration:  Good  Fund of Knowledge:  Appropriate for age and educational level  Intellectual Functioning:  Average range  Insight:  Good  Judgement:  Good  Reliability:  Good  Impulse Control:  Good       Assessment / Plan      Visit Diagnosis/Orders Placed This Visit:    ICD-10-CM ICD-9-CM   1. Severe episode of recurrent major depressive disorder, without psychotic features  F33.2 296.33        PLAN:    Progress toward goal: Not at goal    Prognosis: Good with ongoing treatment    PLAN:  Safety: No acute safety concerns.  Therapist will continue to monitor.    Risk Assessment: Risk of self-harm acutely is low. Risk of self-harm chronically is also low, but could be further elevated in the event of treatment noncompliance and/or AODA.     TREATMENT PLAN/GOALS: Continue supportive psychotherapy efforts and medications as indicated. Treatment and medication options discussed during today's visit. Patient ackowledged and verbally consented to continue with current treatment plan and was educated on the importance of compliance with treatment and follow-up appointments. Patient seems reasonably able to adhere to treatment plan.      Allowed Patient to freely discuss issues  without interruption or judgement with unconditional positive regard, active listening skills, and empathy. Therapist provided a safe, confidential environment to facilitate the development of a positive therapeutic relationship and encouraged open, honest communication. Assisted Patient in identifying risk factors which would indicate the need for higher level of care including thoughts to harm self or others and/or self-harming behavior and encouraged Patient to contact this office, call 911, or present to the nearest emergency room should any of these events occur. Discussed crisis intervention services and means to access. Patient adamantly and convincingly  denies current suicidal or homicidal ideation or perceptual disturbance. Assisted Patient in processing session content; acknowledged and normalized Patient’s thoughts, feelings, and concerns by utilizing a person-centered approach in efforts to build appropriate rapport and a positive therapeutic relationship with open and honest communication.     Follow Up:   No follow-ups on file.        This document has been electronically signed by Annabel Phelps LCSW  July 31, 2024 14:06 EDT

## 2024-09-03 ENCOUNTER — TELEMEDICINE (OUTPATIENT)
Dept: PSYCHIATRY | Facility: CLINIC | Age: 34
End: 2024-09-03
Payer: COMMERCIAL

## 2024-09-03 DIAGNOSIS — F41.1 GENERALIZED ANXIETY DISORDER: ICD-10-CM

## 2024-09-03 DIAGNOSIS — F34.1 PERSISTENT DEPRESSIVE DISORDER WITH ANXIOUS DISTRESS, CURRENTLY MODERATE: Primary | ICD-10-CM

## 2024-09-03 DIAGNOSIS — F90.0 ADHD, PREDOMINANTLY INATTENTIVE TYPE: ICD-10-CM

## 2024-09-03 PROCEDURE — 99214 OFFICE O/P EST MOD 30 MIN: CPT | Performed by: NURSE PRACTITIONER

## 2024-09-03 PROCEDURE — 1160F RVW MEDS BY RX/DR IN RCRD: CPT | Performed by: NURSE PRACTITIONER

## 2024-09-03 PROCEDURE — 1159F MED LIST DOCD IN RCRD: CPT | Performed by: NURSE PRACTITIONER

## 2024-09-03 NOTE — PROGRESS NOTES
This provider is located at [facility], [facility], [address], [address] in [place] [address], using a secure MyChart Video Visit through [employer]. Patient is being seen remotely via telehealth at their home address in [place] [address], and stated they are in a secure environment for this session. The patient's condition being diagnosed/treated is appropriate for telemedicine. The provider identified herself as well as her credentials. The patient, and/or patients guardian, consent to be seen remotely, and when consent is given they understand that the consent allows for patient identifiable information to be sent to a third party as needed. They may refuse to be seen remotely at any time. The electronic data is encrypted and password protected, and the patient and/or guardian has been advised of the potential risks to privacy not withstanding such measures.     You have chosen to receive care through a telehealth visit.  Do you consent to use a video/audio connection for your medical care today? Yes    Subjective   Roby Marvin is a 34 y.o. male who presents today for follow up    Chief Complaint:  poor concentration, anxiety and depression    History of Present Illness:   History of Present Illness  Roby Marvin presents today via my chart video visit for medication management follow-up.  Reports that he has now completely tapered off Prozac and has initiated Trintellix 5 mg daily. Has been taking Trintellix consistently for the past week.  Voices that he has noticed possible improvement in overall mood, saying that he has been slightly more motivated and able to get up to complete tasks. Has experienced more irritability and feels this might likely be related to transitioning of medication along with environmental stressors.  Continues to live with his mother and says that they have a strained relationship.  Anxiety remains constant with severity of symptoms that vary.   "Frequently feels nervous, anxious, on edge and restless.  Depression is persistent with overall low mood, feeling down, decreased motivation and decreased interest in activities.  Says that he frequently becomes \"burnt out\" and easily bored. Feels that he always needs to keep his mind occupied and feels that playing video games helps with this. Rates current anxiety level 7-8/10, rates depression 8/10 on a 0-10 scale with 10 being the worst. Has continued with Strattera 60 mg daily and feels that ADHD symptoms are better controlled with medication. Sleep has been poor, says that over the past couple weeks he is only able to obtain 2 to 3 consecutive hours of sleep each night. Has been able to obtain more over the last couple nights and was able to get about 6 hours of sleep last night.  Denies any issues with appetite.  Denies any current SI/HI.  Has continued with routine therapy appointments, saying that sessions have been moved out to every 2 weeks.  Seeing Alina Phelps LCSW. PHQ-9 total score: 18, TESSIE-7 total score: 12.    Previous Psych Meds: Currently taking fluoxetine (since 7/25/2023) prescribed by PCP, Wellbutrin SR    The following portions of the patient's history were reviewed and updated as appropriate: allergies, current medications, past family history, past medical history, past social history, past surgical history and problem list.      Past Medical History:  Past Medical History:   Diagnosis Date    ADHD 5/3/2024    Anxiety ~07/25/23    Depression ~07/25/23    Liver disease 10/17/23    Diagnosed with having fatty liver deposits, not sure if this falls under same category.       Social History:  Social History     Socioeconomic History    Marital status: Single   Tobacco Use    Smoking status: Never     Passive exposure: Yes    Smokeless tobacco: Never    Tobacco comments:     Social smoker, never habitual   Vaping Use    Vaping status: Never Used   Substance and Sexual Activity    " Alcohol use: Not Currently     Comment: Social drinker, never habitual    Drug use: Not Currently     Types: Marijuana     Comment: Smoked marijuana socially, never habitual    Sexual activity: Not Currently     Partners: Female     Birth control/protection: Condom, None, Birth control pill       Family History:  Family History   Problem Relation Age of Onset    Drug abuse Brother     Drug abuse Brother     Seizures Cousin        Past Surgical History:  History reviewed. No pertinent surgical history.    Problem List:  Patient Active Problem List   Diagnosis    ADHD    Depression       Allergy:   Allergies   Allergen Reactions    Banana Itching and Nausea And Vomiting     Itchy throat        Current Medications:   Current Outpatient Medications   Medication Sig Dispense Refill    allopurinol (ZYLOPRIM) 300 MG tablet Take 1 tablet by mouth Daily.      atomoxetine (STRATTERA) 60 MG capsule Take 1 capsule by mouth Daily. 90 capsule 0    Vortioxetine HBr (Trintellix) 5 MG tablet tablet Take 1 tablet by mouth Daily With Breakfast. 90 tablet 0     No current facility-administered medications for this visit.     Review of Systems   Constitutional:  Negative for activity change, appetite change, unexpected weight gain and unexpected weight loss.   Respiratory:  Negative for shortness of breath.    Cardiovascular:  Negative for chest pain.   Psychiatric/Behavioral:  Positive for decreased concentration and depressed mood. Negative for sleep disturbance and suicidal ideas. The patient is nervous/anxious.      Physical Exam  Constitutional:       General: He is not in acute distress.     Appearance: Normal appearance.   Neurological:      Mental Status: He is alert.     Vitals:   The patient was seen remotely today via a MyChart Video Visit through [facility]. Unable to obtain vital signs due to nature of remote visit.    Mental Status Exam:   Hygiene:    Appears good  Cooperation:  Cooperative  Eye Contact:   CATHI r/t video  visit  Psychomotor Behavior:  Appropriate  Affect:  Full range and Appropriate  Mood: normal  Hopelessness: Denies  Speech:  Normal  Thought Process:  Goal directed and Linear  Thought Content:  Mood congruent  Suicidal:  None  Homicidal:  None  Hallucinations:  None  Delusion:  None  Memory:  Intact  Orientation:  Person, Place, Time, and Situation  Reliability:  good  Insight:  Fair  Judgement:  Fair  Impulse Control:  Good    Assessment & Plan   Problems Addressed this Visit    None  Visit Diagnoses       Persistent depressive disorder with anxious distress, currently moderate    -  Primary    Generalized anxiety disorder        ADHD, predominantly inattentive type              Diagnoses         Codes Comments    Persistent depressive disorder with anxious distress, currently moderate    -  Primary ICD-10-CM: F34.1  ICD-9-CM: 300.4     Generalized anxiety disorder     ICD-10-CM: F41.1  ICD-9-CM: 300.02     ADHD, predominantly inattentive type     ICD-10-CM: F90.0  ICD-9-CM: 314.00             Visit Diagnoses:    ICD-10-CM ICD-9-CM   1. Persistent depressive disorder with anxious distress, currently moderate  F34.1 300.4   2. Generalized anxiety disorder  F41.1 300.02   3. ADHD, predominantly inattentive type  F90.0 314.00     [facility]  ADHD symptoms have improved with medication. He continues to struggle with persistent depression and increased anxiety. Some of his symptoms are likely related to environmental stressors. Has now tapered off Prozac and attributes some symptoms to transition off one medication and initiating another. Has also been struggling with sleep over past couple weeks with improvement over last couple days. Discussed plan and medication regimen/options, will continue with Strattera 60 mg daily and Trintellix 5 mg daily for now. Will consider increasing Trintellix to 10 mg if symptoms persist or worsen. Also discussed adding  Hydroxyzine if sleep continues to be an issues.  Denies any adverse effects of current medication regimen. Encouraged him to continue with counseling as scheduled, currently seeing Alina Phelps LCSW.    -Continue Strattera 60 mg daily  -Continue Trintellix 5 mg daily (will initiate after Prozac taper)    -Reviewed previous available documentation and no available labs on file. JONO reviewed and is appropriate.    GOALS:  Short Term Goals: Patient will be compliant with medication, and patient will have no significant medication related side effects.  Patient will be engaged in psychotherapy as indicated.  Patient will report subjective improvement of symptoms.  Long term goals: To stabilize mood and treat/improve subjective symptoms, the patient will stay out of the hospital, the patient will be at an optimal level of functioning, and the patient will take all medications as prescribed.  The patient/guardian verbalized understanding and agreement with goals that were mutually set.    TREATMENT PLAN: Continue supportive psychotherapy efforts and medications as indicated for patient's diagnosis.  Pharmacological and Non-Pharmacological treatment options discussed during today's visit. Patient/Guardian acknowledged and verbally consented with current treatment plan and was educated on the importance of compliance with treatment and follow-up appointments.      MEDICATION ISSUES:  Discussed medication options and treatment plan of prescribed medication as well as the risks, benefits, any black box warnings, and side effects including potential falls, possible impaired driving, and metabolic adversities among others. Patient is agreeable to call the office with any worsening of symptoms or onset of side effects, or if any concerns or questions arise.  The contact information for the office is made available to the patient. Patient is agreeable to call 911 or go to the nearest ER should they begin having any  SI/HI, or if any urgent concerns arise. No medication side effects or related complaints today.     MEDS ORDERED DURING VISIT:  No orders of the defined types were placed in this encounter.      FOLLOW UP:  Return in about 8 weeks (around 10/29/2024) for Recheck.          This document has been electronically signed by NICCI Gray  September 3, 2024 10:04 EDT    Please note that portions of this note were completed with a voice recognition program. Efforts were made to edit dictation, but occasionally words are mistranscribed.

## 2024-09-05 ENCOUNTER — TELEMEDICINE (OUTPATIENT)
Dept: PSYCHIATRY | Facility: CLINIC | Age: 34
End: 2024-09-05
Payer: COMMERCIAL

## 2024-09-05 DIAGNOSIS — F33.2 SEVERE EPISODE OF RECURRENT MAJOR DEPRESSIVE DISORDER, WITHOUT PSYCHOTIC FEATURES: ICD-10-CM

## 2024-09-05 DIAGNOSIS — F90.0 ATTENTION DEFICIT HYPERACTIVITY DISORDER (ADHD), PREDOMINANTLY INATTENTIVE TYPE: Primary | ICD-10-CM

## 2024-09-05 NOTE — TREATMENT PLAN
Multi-Disciplinary Problems (from Behavioral Health Treatment Plan)      Active Problems       Problem: Depression  Start Date: 09/05/24      Problem Details: The patient self-scales this problem as a  with 10 being the worst.  9        Goal Priority Start Date Expected End Date End Date    Patient will demonstrate the ability to initiate new constructive life skills outside of sessions on a consistent basis. Critical 09/05/24 03/06/25 --    Goal Details: Progress toward goal:  Not appropriate to rate progress toward goal since this is the initial treatment plan.          Goal Intervention Frequency Start Date End Date    Assist patient in setting attainable activities of daily living goals. PRN 09/05/24 --      Goal Intervention Frequency Start Date End Date    Provide education about depression PRN 09/05/24 --    Intervention Details: Duration of treatment until discharged.          Goal Intervention Frequency Start Date End Date    Assist patient in developing healthy coping strategies. PRN 09/05/24 --    Intervention Details: Duration of treatment until discharged.                  Problem: ADHD (Adult)  Start Date: 09/05/24      Problem Details: The patient self-scales this problem as a 7 with 10 being the worst.        Goal Priority Start Date Expected End Date End Date    Patient will sustain attention and concentration to complete chores, and work responsibilites and increase positive interaction in all relationships. Critical 09/05/24 03/06/25 --    Goal Details: Progress toward goal:  Not appropriate to rate progress toward goal since this is the initial treatment plan.        Goal Intervention Frequency Start Date End Date    Assist patient in setting responsible goals and breaking down large tasks. PRN 09/05/24 --    Intervention Details: Duration of treatment until discharged.        Goal Intervention Frequency Start Date End Date    Assist patient in using self monitoring checklist to improve  attention, work performance, and social skills. Weekly 09/05/24 --    Intervention Details: Duration of treatment until discharged.                               I have discussed and reviewed this treatment plan with the patient.  It has been printed for signatures.

## 2024-09-05 NOTE — PROGRESS NOTES
Follow Up Video Visit     Date: 2024   Patient Name: Roby Marvin  : 1990   MRN: [id number]   Time In: 8:00 AM      Time Out: 8:45 AM     Referring Physician: Xuan Borrero MD    Chief Complaint:      ICD-10-CM ICD-9-CM   1. Attention deficit hyperactivity disorder (ADHD), predominantly inattentive type  F90.0 314.00   2. Severe episode of recurrent major depressive disorder, without psychotic features  F33.2 296.33        The provider is located at [facility] [address], [address], [place], [place] using a secure [employer] Video Visit through [employer] for assessment with Annabel Sterling LCSW, MSW.  The Patient is seen remotely at their home address in [place], using a private computer, phone or tablet.  Patient is being seen remotely via telehealth at home address in [place] and stated they are in a secure environment for this session using [facility] Chart. The patient's condition being diagnosed/treated is appropriate for telemedicine. The provider identified self as well as credentials. The patient, and/or patients guardian, consent to be seen remotely, and when consent is given they understand that the consent allows for patient identifiable information to be sent to a third party as needed. They may refuse to be seen remotely at any time. The electronic data is encrypted and password protected, and the patient and/or guardian has been advised of the potential risks to privacy not withstanding such measures.     Patient has chosen to receive care through a telehealth video visit and consents to use an audio/video connection for care today?   This visit has been scheduled as a video visit to comply with patient safety concerns in accordance with [facility] recommendations.    History of Present Illness: Roby Marvin is a 34 y.o. male presents via video visit today for individual session. Pt describes feeling as if his Trintellix is working better for him since transitioning  "from Prozac. Pt does express concern that this could potentially be a Placebo effect but is not sure at this point. Pt reports still having a difficult time with depressive episodes and having some struggles with concentrating due to his ADHD. Pt does believe that the Strattera that his provider changed him too has been very helpful overall. Pt describes having difficulty sleeping and basically sleeping 2-3 hours at a time and then a \"hard crash\" later. Pt and LCSW completed treatment plan and identified appropriate goals and interventions. Pt denies any SI/HI.       Subjective      Review of Systems:   The following portions of the patient's history were reviewed and updated as appropriate: allergies, current medications, past family history, past medical history, past social history, past surgical history and problem list.     Interval History:   No Change    Patient's Support Network Includes:  mother and friend(s)    Functional Status: Moderate impairment     Medications:     Current Outpatient Medications:     allopurinol (ZYLOPRIM) 300 MG tablet, Take 1 tablet by mouth Daily., Disp: , Rfl:     atomoxetine (STRATTERA) 60 MG capsule, Take 1 capsule by mouth Daily., Disp: 90 capsule, Rfl: 0    Vortioxetine HBr (Trintellix) 5 MG tablet tablet, Take 1 tablet by mouth Daily With Breakfast., Disp: 90 tablet, Rfl: 0      Objective     Mental Status Exam:   MENTAL STATUS EXAM   General Appearance:  Cleanly groomed and dressed  Eye Contact:  Good eye contact  Attitude:  Cooperative  Motor Activity:  Normal gait, posture  Muscle Strength:  Normal  Speech:  Normal rate, tone, volume  Mood and affect:  Normal, pleasant  Hopelessness:  Denies  Loneliness: Denies  Thought Process:  Logical  Associations/ Thought Content:  No delusions  Hallucinations:  None  Suicidal Ideations:  Not present  Homicidal Ideation:  Not present  Sensorium:  Alert  Orientation:  Person, place, situation and time  Immediate Recall, Recent, and " Remote Memory:  Intact  Attention Span/ Concentration:  Good  Fund of Knowledge:  Appropriate for age and educational level  Intellectual Functioning:  Average range  Insight:  Good  Judgement:  Good  Reliability:  Good  Impulse Control:  Good       Assessment / Plan      Visit Diagnosis/Orders Placed This Visit:    ICD-10-CM ICD-9-CM   1. Attention deficit hyperactivity disorder (ADHD), predominantly inattentive type  F90.0 314.00   2. Severe episode of recurrent major depressive disorder, without psychotic features  F33.2 296.33        PLAN:    Progress toward goal: Not at goal    Prognosis: Good with ongoing treatment    PLAN:  Safety: No acute safety concerns.  Therapist will continue to monitor.    Risk Assessment: Risk of self-harm acutely is low. Risk of self-harm chronically is also low, but could be further elevated in the event of treatment noncompliance and/or AODA.     TREATMENT PLAN/GOALS: Continue supportive psychotherapy efforts and medications as indicated. Treatment and medication options discussed during today's visit. Patient ackowledged and verbally consented to continue with current treatment plan and was educated on the importance of compliance with treatment and follow-up appointments. Patient seems reasonably able to adhere to treatment plan.      Allowed Patient to freely discuss issues  without interruption or judgement with unconditional positive regard, active listening skills, and empathy. Therapist provided a safe, confidential environment to facilitate the development of a positive therapeutic relationship and encouraged open, honest communication. Assisted Patient in identifying risk factors which would indicate the need for higher level of care including thoughts to harm self or others and/or self-harming behavior and encouraged Patient to contact this office, call 911, or present to the nearest emergency room should any of these events occur. Discussed crisis intervention services  and means to access. Patient adamantly and convincingly denies current suicidal or homicidal ideation or perceptual disturbance. Assisted Patient in processing session content; acknowledged and normalized Patient’s thoughts, feelings, and concerns by utilizing a person-centered approach in efforts to build appropriate rapport and a positive therapeutic relationship with open and honest communication.     Follow Up:   Return in about 2 weeks (around 9/19/2024) for Video visit.        This document has been electronically signed by Annabel Phelps LCSW  September 5, 2024 09:02 EDT

## 2024-09-12 ENCOUNTER — TELEPHONE (OUTPATIENT)
Dept: PSYCHIATRY | Facility: CLINIC | Age: 34
End: 2024-09-12
Payer: COMMERCIAL

## 2024-10-10 ENCOUNTER — TELEPHONE (OUTPATIENT)
Dept: PSYCHIATRY | Facility: CLINIC | Age: 34
End: 2024-10-10
Payer: COMMERCIAL

## 2024-10-10 NOTE — TELEPHONE ENCOUNTER
Patient has been advised and voiced his understanding. He requested to  be worked in sooner to speak to Sho about alternatives. Patient has been worked in 10/16/2024.

## 2024-10-10 NOTE — TELEPHONE ENCOUNTER
Patient called in said he's been on Trintellix about 2 months and had the worst depressive episode in like 2 years. He said he is now coming out of the depressive episode but it lasted two weeks. He would like to know how to taper off Trintellix and would like to try another medication. Advised Patient Sho is out until Monday but I maybe able to get another provider to review and advise.

## 2024-10-16 ENCOUNTER — TELEMEDICINE (OUTPATIENT)
Dept: PSYCHIATRY | Facility: CLINIC | Age: 34
End: 2024-10-16
Payer: COMMERCIAL

## 2024-10-16 DIAGNOSIS — F34.1 PERSISTENT DEPRESSIVE DISORDER WITH ANXIOUS DISTRESS, CURRENTLY MODERATE: Primary | ICD-10-CM

## 2024-10-16 DIAGNOSIS — F41.1 GENERALIZED ANXIETY DISORDER: ICD-10-CM

## 2024-10-16 DIAGNOSIS — F90.0 ATTENTION DEFICIT HYPERACTIVITY DISORDER (ADHD), PREDOMINANTLY INATTENTIVE TYPE: ICD-10-CM

## 2024-10-16 RX ORDER — SERTRALINE HYDROCHLORIDE 25 MG/1
TABLET, FILM COATED ORAL
Qty: 45 TABLET | Refills: 0 | Status: SHIPPED | OUTPATIENT
Start: 2024-10-16 | End: 2024-11-15

## 2024-10-16 NOTE — PROGRESS NOTES
This provider is located at [facility], [address], [address] in [place] [address], using a secure [facility] In[employer] Video Visit through [employer]. Patient is being seen remotely via telehealth at their home address in [place] [address], and stated they are in a secure environment for this session. The patient's condition being diagnosed/treated is appropriate for telemedicine. The provider identified herself as well as her credentials. The patient, and/or patients guardian, consent to be seen remotely, and when consent is given they understand that the consent allows for patient identifiable information to be sent to a third party as needed. They may refuse to be seen remotely at any time. The electronic data is encrypted and password protected, and the patient and/or guardian has been advised of the potential risks to privacy not withstanding such measures.     You have chosen to receive care through a telehealth visit.  Do you consent to use a video/audio connection for your medical care today? Yes    Subjective   Roby Marvin is a 34 y.o. male who presents today for follow up    Chief Complaint:  poor concentration, anxiety and depression    History of Present Illness:   History of Present Illness  Roby Marvin presents via MyChart video visit for sooner follow up due to worsening symptoms. Voices that he had initially felt improvement in symptoms after initiating Trintellix, but symptoms worsened very quickly and felt medication was contributing to increased symptoms. Says that mood fluctuates, feeling depressed then irritable and anxious. Having episodes of feeling down, decreased motivation, crying and no interest in activities. Rates depression 9/10 and anxiety between 7-9/10 on a 0-10 scale with 10 being worst. Has scheduled more frequent therapy sessions. Says that ADHD symptoms have been well controlled. Sleep varies, either sleeping too much or not enough. Denies any  current SI/HI. PHQ-9 total score: 21, TESSIE-7 total score: 7    Previous Psych Meds: Currently taking fluoxetine (since 7/25/2023) prescribed by PCP, Wellbutrin SR, Trintellix    The following portions of the patient's history were reviewed and updated as appropriate: allergies, current medications, past family history, past medical history, past social history, past surgical history and problem list.    Past Medical History:   Diagnosis Date    ADHD 5/3/2024    Anxiety ~07/25/23    Depression ~07/25/23    Liver disease 10/17/23    Diagnosed with having fatty liver deposits, not sure if this falls under same category.     Social History     Socioeconomic History    Marital status: Single   Tobacco Use    Smoking status: Never     Passive exposure: Yes    Smokeless tobacco: Never    Tobacco comments:     Social smoker, never habitual   Vaping Use    Vaping status: Never Used   Substance and Sexual Activity    Alcohol use: Not Currently     Comment: Social drinker, never habitual    Drug use: Not Currently     Types: Marijuana     Comment: Smoked marijuana socially, never habitual    Sexual activity: Not Currently     Partners: Female     Birth control/protection: Condom, None, Birth control pill     Family History   Problem Relation Age of Onset    Drug abuse Brother     Drug abuse Brother     Seizures Cousin      Past Surgical History:  History reviewed. No pertinent surgical history.    Patient Active Problem List   Diagnosis    ADHD    Depression     Allergies   Allergen Reactions    Banana Itching and Nausea And Vomiting     Itchy throat      Current Outpatient Medications   Medication Sig Dispense Refill    allopurinol (ZYLOPRIM) 300 MG tablet Take 1 tablet by mouth Daily.      atomoxetine (STRATTERA) 60 MG capsule Take 1 capsule by mouth Daily. 90 capsule 0    sertraline (Zoloft) 25 MG tablet Take 1 tablet by mouth Daily for 15 days, THEN 2 tablets Daily for 15 days. 45 tablet 0     No current  facility-administered medications for this visit.     Review of Systems   Constitutional:  Negative for activity change, appetite change, unexpected weight gain and unexpected weight loss.   Respiratory:  Negative for shortness of breath.    Cardiovascular:  Negative for chest pain.   Psychiatric/Behavioral:  Positive for decreased concentration and depressed mood. Negative for sleep disturbance and suicidal ideas. The patient is nervous/anxious.      Physical Exam  Constitutional:       General: He is not in acute distress.     Appearance: Normal appearance.   Neurological:      Mental Status: He is alert.       Vitals:   The patient was seen remotely today via a MyChart Video Visit through [facility]. Unable to obtain vital signs due to nature of remote visit.    Mental Status Exam:   Hygiene:    Appears good  Cooperation:  Cooperative  Eye Contact:   CATHI r/t video visit  Psychomotor Behavior:  Appropriate  Affect:  Full range and Appropriate  Mood: normal  Hopelessness: Denies  Speech:  Normal  Thought Process:  Goal directed and Linear  Thought Content:  Mood congruent  Suicidal:  None  Homicidal:  None  Hallucinations:  None  Delusion:  None  Memory:  Intact  Orientation:  Person, Place, Time, and Situation  Reliability:  good  Insight:  Fair  Judgement:  Fair  Impulse Control:  Good    Assessment & Plan   Problems Addressed this Visit          Mental Health    ADHD    Relevant Medications    sertraline (Zoloft) 25 MG tablet     Other Visit Diagnoses       Persistent depressive disorder with anxious distress, currently moderate    -  Primary    Relevant Medications    sertraline (Zoloft) 25 MG tablet    Generalized anxiety disorder        Relevant Medications    sertraline (Zoloft) 25 MG tablet          Diagnoses         Codes Comments    Persistent depressive disorder with anxious distress, currently moderate    -  Primary ICD-10-CM: F34.1  ICD-9-CM: 300.4     Generalized anxiety disorder     ICD-10-CM:  F41.1  ICD-9-CM: 300.02     Attention deficit hyperactivity disorder (ADHD), predominantly inattentive type     ICD-10-CM: F90.0  ICD-9-CM: 314.00             Visit Diagnoses:    ICD-10-CM ICD-9-CM   1. Persistent depressive disorder with anxious distress, currently moderate  F34.1 300.4   2. Generalized anxiety disorder  F41.1 300.02   3. Attention deficit hyperactivity disorder (ADHD), predominantly inattentive type  F90.0 314.00     Roby presents today for follow up to discuss worsening depression that he feels is related to current medication. Voices interest in discontinuing medication and starting different medication due to severity of symptoms. Discussed medication options, will taper/discontinue Trintellix and start Sertraline. Will continue with Strattera as previously prescribed. Encouraged him to continue with counseling as scheduled, currently seeing Alina Phelps LCSW.  -Continue Strattera 60 mg daily  -Stop Trintellix 5 mg daily   -Start Sertraline 25 mg daily x 15 days then increase to 50 mg daily   -Reviewed previous available documentation and no available labs on file.   -JONO reviewed and is appropriate.    GOALS:  Short Term Goals: Patient will be compliant with medication, and patient will have no significant medication related side effects.  Patient will be engaged in psychotherapy as indicated.  Patient will report subjective improvement of symptoms.  Long term goals: To stabilize mood and treat/improve subjective symptoms, the patient will stay out of the hospital, the patient will be at an optimal level of functioning, and the patient will take all medications as prescribed.  The patient/guardian verbalized understanding and agreement with goals that were mutually set.    TREATMENT PLAN: Continue supportive psychotherapy efforts and medications as indicated for patient's diagnosis.  Pharmacological and Non-Pharmacological treatment options discussed during today's visit.  Patient/Guardian acknowledged and verbally consented with current treatment plan and was educated on the importance of compliance with treatment and follow-up appointments.      MEDICATION ISSUES:  Discussed medication options and treatment plan of prescribed medication as well as the risks, benefits, any black box warnings, and side effects including potential falls, possible impaired driving, and metabolic adversities among others. Patient is agreeable to call the office with any worsening of symptoms or onset of side effects, or if any concerns or questions arise.  The contact information for the office is made available to the patient. Patient is agreeable to call 911 or go to the nearest ER should they begin having any SI/HI, or if any urgent concerns arise. No medication side effects or related complaints today.     MEDS ORDERED DURING VISIT:  New Medications Ordered This Visit   Medications    sertraline (Zoloft) 25 MG tablet     Sig: Take 1 tablet by mouth Daily for 15 days, THEN 2 tablets Daily for 15 days.     Dispense:  45 tablet     Refill:  0       FOLLOW UP:  Return in about 4 weeks (around 11/13/2024) for Recheck, Video visit.          This document has been electronically signed by NICCI Gray  November 3, 2024 23:42 EST    Please note that portions of this note were completed with a voice recognition program. Efforts were made to edit dictation, but occasionally words are mistranscribed.

## 2024-10-18 ENCOUNTER — TELEMEDICINE (OUTPATIENT)
Dept: PSYCHIATRY | Facility: CLINIC | Age: 34
End: 2024-10-18
Payer: COMMERCIAL

## 2024-10-18 DIAGNOSIS — F90.0 ATTENTION DEFICIT HYPERACTIVITY DISORDER (ADHD), PREDOMINANTLY INATTENTIVE TYPE: Primary | ICD-10-CM

## 2024-10-18 DIAGNOSIS — F33.2 SEVERE EPISODE OF RECURRENT MAJOR DEPRESSIVE DISORDER, WITHOUT PSYCHOTIC FEATURES: ICD-10-CM
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
## 2024-11-13 ENCOUNTER — TELEMEDICINE (OUTPATIENT)
Dept: PSYCHIATRY | Facility: CLINIC | Age: 34
End: 2024-11-13
Payer: COMMERCIAL

## 2024-11-13 DIAGNOSIS — F34.1 PERSISTENT DEPRESSIVE DISORDER WITH ANXIOUS DISTRESS, CURRENTLY MODERATE: ICD-10-CM

## 2024-11-13 DIAGNOSIS — F41.1 GENERALIZED ANXIETY DISORDER: ICD-10-CM

## 2024-11-13 NOTE — PROGRESS NOTES
This provider is located at [facility], [address], [address] [facility] [facility] in [place] [address], using a secure MyChart Video Visit through [email or web address]. Patient is being seen remotely via telehealth at their home address in [place] [address], and stated they are in a secure environment for this session. The patient's condition being diagnosed/treated is appropriate for telemedicine. The provider identified herself as well as her credentials. The patient, and/or patients guardian, consent to be seen remotely, and when consent is given they understand that the consent allows for patient identifiable information to be sent to a third party as needed. They may refuse to be seen remotely at any time. The electronic data is encrypted and password protected, and the patient and/or guardian has been advised of the potential risks to privacy not withstanding such measures.     You have chosen to receive care through a telehealth visit.  Do you consent to use a video/audio connection for your medical care today? Yes    Subjective   Roby Marvin is a 34 y.o. male who presents today for follow up    Chief Complaint:  poor concentration, anxiety and depression    History of Present Illness:   History of Present Illness  Roby Marvin presents via MyChart video visit for medication management follow up.  Reports that he has noticed some positive improvement in symptoms since initiating sertraline.  Medication was started about 2.5 weeks ago after awaiting mail order prescription.  Anxiety remains present, but has noticed overall decrease in severity of symptoms.  Depression has slightly improved, but continues to have persistent low mood. Has some days when symptoms are worse depending on situations. Rates depression 8/10, rates anxiety 6/10 on a 0-10 scale with 10 being the worst. Currently dealing with sinus/respiratory issues that he fees is seasonal related. Not on any type of routine sleep  schedule, sometimes staying up late and sleeping later in day. Has not been consistent with therapy because of sleep schedule. Currently taking Strattera 60 mg daily and Sertraline 50 mg daily. Denies SI/HI. PHQ-9 total score: 24, TESSIE-7 total score: 9.    Previous Psych Meds: Currently taking fluoxetine (since 7/25/2023) prescribed by PCP, Wellbutrin SR, Trintellix    The following portions of the patient's history were reviewed and updated as appropriate: allergies, current medications, past family history, past medical history, past social history, past surgical history and problem list.    Past Medical History:   Diagnosis Date    ADHD 5/3/2024    Anxiety ~07/25/23    Depression ~07/25/23    Liver disease 10/17/23    Diagnosed with having fatty liver deposits, not sure if this falls under same category.     Social History     Socioeconomic History    Marital status: Single   Tobacco Use    Smoking status: Never     Passive exposure: Yes    Smokeless tobacco: Never    Tobacco comments:     Social smoker, never habitual   Vaping Use    Vaping status: Never Used   Substance and Sexual Activity    Alcohol use: Not Currently     Comment: Social drinker, never habitual    Drug use: Not Currently     Types: Marijuana     Comment: Smoked marijuana socially, never habitual    Sexual activity: Not Currently     Partners: Female     Birth control/protection: Condom, None, Birth control pill     Family History   Problem Relation Age of Onset    Drug abuse Brother     Drug abuse Brother     Seizures Cousin      Past Surgical History:  History reviewed. No pertinent surgical history.    Patient Active Problem List   Diagnosis    ADHD    Depression     Allergies   Allergen Reactions    Banana Itching and Nausea And Vomiting     Itchy throat      Current Outpatient Medications   Medication Sig Dispense Refill    sertraline (Zoloft) 50 MG tablet Take 1 tablet by mouth Daily. 90 tablet 0    allopurinol (ZYLOPRIM) 300 MG tablet  Take 1 tablet by mouth Daily.      atomoxetine (STRATTERA) 60 MG capsule Take 1 capsule by mouth Daily. 90 capsule 0     No current facility-administered medications for this visit.     Review of Systems   Constitutional:  Negative for activity change, appetite change, unexpected weight gain and unexpected weight loss.   Respiratory:  Negative for shortness of breath.    Cardiovascular:  Negative for chest pain.   Psychiatric/Behavioral:  Positive for decreased concentration, sleep disturbance and depressed mood. Negative for suicidal ideas. The patient is nervous/anxious.      Physical Exam  Constitutional:       General: He is not in acute distress.     Appearance: Normal appearance.   Neurological:      Mental Status: He is alert.     Vitals:   The patient was seen remotely today [facility] through [facility]. Unable to obtain vital signs due to nature of remote visit.    Mental Status Exam:   Hygiene:    Appears good  Cooperation:  Cooperative  Eye Contact:   [facility] r/t video visit  Psychomotor Behavior:  Appropriate  Affect:  Full range and Appropriate  Mood: normal  Hopelessness: Denies  Speech:  Normal  Thought Process:  Goal directed and Linear  Thought Content:  Mood congruent  Suicidal:  None  Homicidal:  None  Hallucinations:  None  Delusion:  None  Memory:  Intact  Orientation:  Person, Place, Time, and Situation  Reliability:  good  Insight:  Fair  Judgement:  Fair  Impulse Control:  Good    Assessment & Plan   Problems Addressed this Visit    None  Visit Diagnoses       Persistent depressive disorder with anxious distress, currently moderate        Relevant Medications    sertraline (Zoloft) 50 MG tablet    Generalized anxiety disorder        Relevant Medications    sertraline (Zoloft) 50 MG tablet          Diagnoses         Codes Comments    Persistent depressive disorder with anxious distress, currently moderate     ICD-10-CM: F34.1  ICD-9-CM: 300.4     Generalized anxiety disorder      ICD-10-CM: F41.1  ICD-9-CM: 300.02             Visit Diagnoses:    ICD-10-CM ICD-9-CM   1. Persistent depressive disorder with anxious distress, currently moderate  F34.1 300.4   2. Generalized anxiety disorder  F41.1 300.02     Roby presents via MyChart video visit for medication management follow up. He is doing well since starting Sertraline with some positive improvements in anxiety by decreasing severity of symptoms. Depression remains present. Agreeable to continue with Sertraline 50 mg daily as medication was started less than 3 weeks ago with only recent increase in dose. Will continue with Strattera 60 mg daily. Denies any adverse effects of medication. Encouraged a more consistent sleep schedule and importance of routine counseling. Currently seeing Alina Phelps LCSW.    -Continue Strattera 60 mg daily  -Refill Sertraline 50 mg daily   -Reviewed previous available documentation and no available labs on file.   -JONO reviewed and is appropriate.    GOALS:  Short Term Goals: Patient will be compliant with medication, and patient will have no significant medication related side effects.  Patient will be engaged in psychotherapy as indicated.  Patient will report subjective improvement of symptoms.  Long term goals: To stabilize mood and treat/improve subjective symptoms, the patient will stay out of the hospital, the patient will be at an optimal level of functioning, and the patient will take all medications as prescribed.  The patient/guardian verbalized understanding and agreement with goals that were mutually set.    TREATMENT PLAN: Continue supportive psychotherapy efforts and medications as indicated for patient's diagnosis.  Pharmacological and Non-Pharmacological treatment options discussed during today's visit. Patient/Guardian acknowledged and verbally consented with current treatment plan and was educated on the importance of compliance with treatment and follow-up appointments.       MEDICATION ISSUES:  Discussed medication options and treatment plan of prescribed medication as well as the risks, benefits, any black box warnings, and side effects including potential falls, possible impaired driving, and metabolic adversities among others. Patient is agreeable to call the office with any worsening of symptoms or onset of side effects, or if any concerns or questions arise.  The contact information for the office is made available to the patient. Patient is agreeable to call 911 or go to the nearest ER should they begin having any SI/HI, or if any urgent concerns arise. No medication side effects or related complaints today.     MEDS ORDERED DURING VISIT:  New Medications Ordered This Visit   Medications    sertraline (Zoloft) 50 MG tablet     Sig: Take 1 tablet by mouth Daily.     Dispense:  90 tablet     Refill:  0       FOLLOW UP:  Return in about 6 weeks (around 12/25/2024) for Recheck.          This document has been electronically signed by NICCI Gray  November 13, 2024 14:03 EST    Please note that portions of this note were completed with a voice recognition program. Efforts were made to edit dictation, but occasionally words are mistranscribed.

## 2024-11-20 DIAGNOSIS — F90.0 ADHD, PREDOMINANTLY INATTENTIVE TYPE: ICD-10-CM

## 2024-11-20 RX ORDER — ATOMOXETINE 60 MG/1
60 CAPSULE ORAL DAILY
Qty: 90 CAPSULE | Refills: 0 | Status: SHIPPED | OUTPATIENT
Start: 2024-11-20

## 2024-11-21 ENCOUNTER — TELEMEDICINE (OUTPATIENT)
Dept: PSYCHIATRY | Facility: CLINIC | Age: 34
End: 2024-11-21
Payer: COMMERCIAL

## 2024-11-21 DIAGNOSIS — F90.0 ATTENTION DEFICIT HYPERACTIVITY DISORDER (ADHD), PREDOMINANTLY INATTENTIVE TYPE: Primary | ICD-10-CM

## 2024-11-21 DIAGNOSIS — F33.2 SEVERE EPISODE OF RECURRENT MAJOR DEPRESSIVE DISORDER, WITHOUT PSYCHOTIC FEATURES: ICD-10-CM

## 2024-11-21 NOTE — PROGRESS NOTES
Follow Up Video Visit     Date: 2024   Patient Name: Roby Marvin  : 1990   MRN: [id number]   Time In: 2:51 PM      Time Out: 3:12 PM     Referring Physician: Xuan Borrero MD    Chief Complaint:      ICD-10-CM ICD-9-CM   1. Attention deficit hyperactivity disorder (ADHD), predominantly inattentive type  F90.0 314.00   2. Severe episode of recurrent major depressive disorder, without psychotic features  F33.2 296.33        The provider is located at [facility] [address], [address], [place], [place]. using a secure [email or web address] Video Visit through [email or web address] for assessment with Annabel Sterling LCSW, MSW.  The Patient is seen remotely at their home address in [place], using a private computer, phone or tablet.  Patient is being seen remotely via telehealth at home address in [place] and stated they are in a secure environment for this session using [facility] Chart. The patient's condition being diagnosed/treated is appropriate for telemedicine. The provider identified self as well as credentials. The patient, and/or patients guardian, consent to be seen remotely, and when consent is given they understand that the consent allows for patient identifiable information to be sent to a third party as needed. They may refuse to be seen remotely at any time. The electronic data is encrypted and password protected, and the patient and/or guardian has been advised of the potential risks to privacy not withstanding such measures.     Patient has chosen to receive care through a telehealth video visit and consents to use an audio/video connection for care today?   This visit has been scheduled as a video visit to comply with patient safety concerns in accordance with [facility] recommendations.    History of Present Illness: Roby Marvin is a 34 y.o. male presents via video visit today for individual session. Pt describes having a very difficult time with his depression and has been experiencing  "depressive episodes. Pt reports that things were \"about the same.\" Pt describes that he has been having a very difficult time with a romantic relationship that ended unexpectedly. Pt describes his anxiety being more manageable. Pt describes there has been more stress with his mother and that's been very difficult. Pt describes using his video game for coping purposes and this being somewhat helpful for him. Pt denies any SI/HI at this time.       Subjective      Review of Systems:   The following portions of the patient's history were reviewed and updated as appropriate: allergies, current medications, past family history, past medical history, past social history, past surgical history and problem list.     Interval History:   No Change    Patient's Support Network Includes:  friend(s)    Functional Status: Mild impairment     Medications:     Current Outpatient Medications:     allopurinol (ZYLOPRIM) 300 MG tablet, Take 1 tablet by mouth Daily., Disp: , Rfl:     atomoxetine (STRATTERA) 60 MG capsule, Take 1 capsule by mouth Daily., Disp: 90 capsule, Rfl: 0    sertraline (Zoloft) 50 MG tablet, Take 1 tablet by mouth Daily., Disp: 90 tablet, Rfl: 0      Objective     Mental Status Exam:   MENTAL STATUS EXAM   General Appearance:  Cleanly groomed and dressed  Eye Contact:  Good eye contact  Attitude:  Cooperative  Motor Activity:  Normal gait, posture  Muscle Strength:  Normal  Speech:  Normal rate, tone, volume  Mood and affect:  Normal, pleasant  Hopelessness:  Denies  Loneliness: Denies  Thought Process:  Logical  Associations/ Thought Content:  No delusions  Hallucinations:  None  Suicidal Ideations:  Not present  Homicidal Ideation:  Not present  Sensorium:  Alert  Orientation:  Place, situation, time and person  Immediate Recall, Recent, and Remote Memory:  Intact  Attention Span/ Concentration:  Good  Fund of Knowledge:  Appropriate for age and educational level  Intellectual Functioning:  Average " range  Insight:  Good  Judgement:  Good  Reliability:  Good  Impulse Control:  Good       Assessment / Plan      Visit Diagnosis/Orders Placed This Visit:    ICD-10-CM ICD-9-CM   1. Attention deficit hyperactivity disorder (ADHD), predominantly inattentive type  F90.0 314.00   2. Severe episode of recurrent major depressive disorder, without psychotic features  F33.2 296.33        PLAN:    Progress toward goal: Not at goal    Prognosis: Good with ongoing treatment    PLAN:  Safety: No acute safety concerns.  Therapist will continue to monitor.    Risk Assessment: Risk of self-harm acutely is low. Risk of self-harm chronically is also low, but could be further elevated in the event of treatment noncompliance and/or AODA.     TREATMENT PLAN/GOALS: Continue supportive psychotherapy efforts and medications as indicated. Treatment and medication options discussed during today's visit. Patient ackowledged and verbally consented to continue with current treatment plan and was educated on the importance of compliance with treatment and follow-up appointments. Patient seems reasonably able to adhere to treatment plan.      Allowed Patient to freely discuss issues  without interruption or judgement with unconditional positive regard, active listening skills, and empathy. Therapist provided a safe, confidential environment to facilitate the development of a positive therapeutic relationship and encouraged open, honest communication. Assisted Patient in identifying risk factors which would indicate the need for higher level of care including thoughts to harm self or others and/or self-harming behavior and encouraged Patient to contact this office, call 911, or present to the nearest emergency room should any of these events occur. Discussed crisis intervention services and means to access. Patient adamantly and convincingly denies current suicidal or homicidal ideation or perceptual disturbance. Assisted Patient in processing  session content; acknowledged and normalized Patient’s thoughts, feelings, and concerns by utilizing a person-centered approach in efforts to build appropriate rapport and a positive therapeutic relationship with open and honest communication.     Follow Up:   No follow-ups on file.        This document has been electronically signed by Annabel Phelps LCSW  November 21, 2024 14:52 EST

## 2024-12-05 ENCOUNTER — TELEMEDICINE (OUTPATIENT)
Dept: PSYCHIATRY | Facility: CLINIC | Age: 34
End: 2024-12-05
Payer: COMMERCIAL

## 2024-12-05 DIAGNOSIS — F33.2 SEVERE EPISODE OF RECURRENT MAJOR DEPRESSIVE DISORDER, WITHOUT PSYCHOTIC FEATURES: ICD-10-CM

## 2024-12-05 DIAGNOSIS — F90.0 ATTENTION DEFICIT HYPERACTIVITY DISORDER (ADHD), PREDOMINANTLY INATTENTIVE TYPE: Primary | ICD-10-CM

## 2024-12-05 NOTE — PROGRESS NOTES
Follow Up Video Visit     Date: 2024   Patient Name: Roby Marvin  : 1990   MRN: [id number]   Time In: 2:34 PM      Time Out: 2:55 PM     Referring Physician: Xuan Borrero MD    Chief Complaint:      ICD-10-CM ICD-9-CM   1. Attention deficit hyperactivity disorder (ADHD), predominantly inattentive type  F90.0 314.00   2. Severe episode of recurrent major depressive disorder, without psychotic features  F33.2 296.33        The provider is located at [facility] [place] [facility] [address], [address], [place], [place]. using a secure [employer] Video Visit through [employer] for assessment with Annabel Sterling LCSW, MSW.  The Patient is seen remotely at their home address in [place], using a private computer, phone or tablet.  Patient is being seen remotely via telehealth at home address in [place] and stated they are in a secure environment for this session using [facility] Chart. The patient's condition being diagnosed/treated is appropriate for telemedicine. The provider identified self as well as credentials. The patient, and/or patients guardian, consent to be seen remotely, and when consent is given they understand that the consent allows for patient identifiable information to be sent to a third party as needed. They may refuse to be seen remotely at any time. The electronic data is encrypted and password protected, and the patient and/or guardian has been advised of the potential risks to privacy not withstanding such measures.     Patient has chosen to receive care through a telehealth video visit and consents to use an audio/video connection for care today?   This visit has been scheduled as a video visit to comply with patient safety concerns in accordance with [facility] recommendations.    History of Present Illness: Roby Marvin is a 34 y.o. male presents via video visit today for individual session. Pt describes things going well overall. Pt reports feeling frustrated with his mother  and the things that she does that are triggers for him. Pt reports that his medication has been helpful to him, he feels. Pt feels aggravated with his mother as she is less than understanding about his mental health issues. Pt feels that his relationship with his mother is the most problematic issue for him right now. Pt reports that he is not SI/HI. Pt is using coping skills to the best of his ability.       Subjective      Review of Systems:   The following portions of the patient's history were reviewed and updated as appropriate: allergies, current medications, past family history, past medical history, past social history, past surgical history and problem list.     Interval History:   No Change    Patient's Support Network Includes:  friend(s)    Functional Status: Mild impairment     Medications:     Current Outpatient Medications:     allopurinol (ZYLOPRIM) 300 MG tablet, Take 1 tablet by mouth Daily., Disp: , Rfl:     atomoxetine (STRATTERA) 60 MG capsule, Take 1 capsule by mouth Daily., Disp: 90 capsule, Rfl: 0    sertraline (Zoloft) 50 MG tablet, Take 1 tablet by mouth Daily., Disp: 90 tablet, Rfl: 0      Objective     Mental Status Exam:   MENTAL STATUS EXAM   General Appearance:  Cleanly groomed and dressed  Eye Contact:  Good eye contact  Attitude:  Cooperative  Motor Activity:  Normal gait, posture  Muscle Strength:  Normal  Speech:  Normal rate, tone, volume  Mood and affect:  Normal, pleasant  Hopelessness:  Denies  Loneliness: Denies  Associations/ Thought Content:  No delusions  Hallucinations:  None  Suicidal Ideations:  Not present  Homicidal Ideation:  Not present  Sensorium:  Alert  Orientation:  Person, time, place and situation  Immediate Recall, Recent, and Remote Memory:  Intact  Attention Span/ Concentration:  Good  Fund of Knowledge:  Appropriate for age and educational level  Intellectual Functioning:  Average range  Insight:  Good  Judgement:  Good  Reliability:  Good  Impulse Control:   Good       Assessment / Plan      Visit Diagnosis/Orders Placed This Visit:    ICD-10-CM ICD-9-CM   1. Attention deficit hyperactivity disorder (ADHD), predominantly inattentive type  F90.0 314.00   2. Severe episode of recurrent major depressive disorder, without psychotic features  F33.2 296.33        PLAN:    Progress toward goal: Not at goal    Prognosis: Good with ongoing treatment    PLAN:  Safety: No acute safety concerns.  Therapist will continue to monitor.    Risk Assessment: Risk of self-harm acutely is low. Risk of self-harm chronically is also low, but could be further elevated in the event of treatment noncompliance and/or AODA.     TREATMENT PLAN/GOALS: Continue supportive psychotherapy efforts and medications as indicated. Treatment and medication options discussed during today's visit. Patient ackowledged and verbally consented to continue with current treatment plan and was educated on the importance of compliance with treatment and follow-up appointments. Patient seems reasonably able to adhere to treatment plan.      Allowed Patient to freely discuss issues  without interruption or judgement with unconditional positive regard, active listening skills, and empathy. Therapist provided a safe, confidential environment to facilitate the development of a positive therapeutic relationship and encouraged open, honest communication. Assisted Patient in identifying risk factors which would indicate the need for higher level of care including thoughts to harm self or others and/or self-harming behavior and encouraged Patient to contact this office, call 911, or present to the nearest emergency room should any of these events occur. Discussed crisis intervention services and means to access. Patient adamantly and convincingly denies current suicidal or homicidal ideation or perceptual disturbance. Assisted Patient in processing session content; acknowledged and normalized Patient’s thoughts, feelings, and  concerns by utilizing a person-centered approach in efforts to build appropriate rapport and a positive therapeutic relationship with open and honest communication.     Follow Up:   No follow-ups on file.        This document has been electronically signed by Annabel Phelps LCSW  December 5, 2024 14:35 EST

## 2025-01-10 ENCOUNTER — TELEMEDICINE (OUTPATIENT)
Dept: PSYCHIATRY | Facility: CLINIC | Age: 35
End: 2025-01-10
Payer: COMMERCIAL

## 2025-01-10 DIAGNOSIS — F34.1 PERSISTENT DEPRESSIVE DISORDER WITH ANXIOUS DISTRESS, CURRENTLY MODERATE: Primary | ICD-10-CM

## 2025-01-10 DIAGNOSIS — F41.1 GENERALIZED ANXIETY DISORDER: ICD-10-CM

## 2025-01-10 DIAGNOSIS — F90.0 ADHD, PREDOMINANTLY INATTENTIVE TYPE: ICD-10-CM

## 2025-01-10 NOTE — PROGRESS NOTES
Mode of Visit: Video   Location of patient: -HOME-   Location of provider: +[facility]+   You have chosen to receive care through a telehealth visit.   The patient has signed the video visit consent form.   The visit included audio and video interaction. No technical issues occurred during this visit.     Subjective   Roby Marvin is a 34 y.o. male who presents today for follow up    Chief Complaint:  poor concentration, anxiety and depression    History of Present Illness:   History of Present Illness  Roby Marvin presents for medication management via MyChart video visit.  His last follow-up visit was 11/13/2024. Says that he stopped medication for several weeks then restarted about one week ago after experiencing decline in mood. Recently experienced the highest level of anxiety that he has had a long time.  His mother recently fell and now has unsteady gait, says that he needs to be near to make sure she does not fall again. Feeling more anxious, nervous, on edge and worries. Mood has also been down, decreased motivation and no interest in doing things. Rates anxiety 8-9/10 and rates depression 8-9/10 on a 0-10 scale with 10 being the worst. Sleep has been very sporadic over past couple weeks with waking during night when mother needs to get up. Has been sleeping for a couple hours at a time during night, but now seems to be more consistent over past three days. Denies issues with appetite. Denies SI/HI. PHQ-9 total score: 20, TESSIE-7 total score: 15.    Previous Psych Meds: Currently taking fluoxetine (since 7/25/2023) prescribed by PCP, Wellbutrin SR, Trintellix    The following portions of the patient's history were reviewed and updated as appropriate: allergies, current medications, past family history, past medical history, past social history, past surgical history and problem list.    Past Medical History:   Diagnosis Date    ADHD 5/3/2024    Anxiety ~07/25/23    Depression ~07/25/23    Liver disease 10/17/23     Diagnosed with having fatty liver deposits, not sure if this falls under same category.     Social History     Socioeconomic History    Marital status: Single   Tobacco Use    Smoking status: Never     Passive exposure: Yes    Smokeless tobacco: Never    Tobacco comments:     Social smoker, never habitual   Vaping Use    Vaping status: Never Used   Substance and Sexual Activity    Alcohol use: Not Currently     Comment: Social drinker, never habitual    Drug use: Not Currently     Types: Marijuana     Comment: Smoked marijuana socially, never habitual    Sexual activity: Not Currently     Partners: Female     Birth control/protection: Condom, None, Birth control pill     Family History   Problem Relation Age of Onset    Drug abuse Brother     Drug abuse Brother     Seizures Cousin      Past Surgical History:  History reviewed. No pertinent surgical history.    Patient Active Problem List   Diagnosis    ADHD    Depression     Allergies   Allergen Reactions    Banana Itching and Nausea And Vomiting     Itchy throat      Current Outpatient Medications   Medication Sig Dispense Refill    allopurinol (ZYLOPRIM) 300 MG tablet Take 1 tablet by mouth Daily.      atomoxetine (STRATTERA) 60 MG capsule Take 1 capsule by mouth Daily. 90 capsule 0    sertraline (Zoloft) 50 MG tablet Take 1 tablet by mouth Daily. 90 tablet 0     No current facility-administered medications for this visit.     Review of Systems   Constitutional:  Negative for activity change, appetite change, unexpected weight gain and unexpected weight loss.   Respiratory:  Negative for shortness of breath.    Cardiovascular:  Negative for chest pain.   Psychiatric/Behavioral:  Positive for decreased concentration, sleep disturbance, depressed mood and stress. Negative for suicidal ideas. The patient is nervous/anxious.      Physical Exam  Constitutional:       General: He is not in acute distress.     Appearance: Normal appearance.   Neurological:      Mental  Status: He is alert.     Vitals:   The patient was seen remotely today via a MyChart Video Visit through [facility]. Unable to obtain vital signs due to nature of remote visit.    Mental Status Exam:   Hygiene:    Appears good  Cooperation:  Cooperative  Eye Contact:   CATHI r/t video visit  Psychomotor Behavior:  Appropriate  Affect:  Full range and Appropriate  Mood: euthymic  Speech:  Normal  Thought Process:  Goal directed and Linear  Thought Content:  Mood congruent  Suicidal:  None  Homicidal:  None  Hallucinations:  None  Delusion:  None  Memory:  Intact  Orientation:  Person, Place, Time, and Situation  Reliability:  good  Insight:  Fair  Judgement:  Fair  Impulse Control:  Good    Assessment & Plan   Problems Addressed this Visit    None  Visit Diagnoses       Persistent depressive disorder with anxious distress, currently moderate    -  Primary    Generalized anxiety disorder              Diagnoses         Codes Comments    Persistent depressive disorder with anxious distress, currently moderate    -  Primary ICD-10-CM: F34.1  ICD-9-CM: 300.4     Generalized anxiety disorder     ICD-10-CM: F41.1  ICD-9-CM: 300.02           Visit Diagnoses:    ICD-10-CM ICD-9-CM   1. Persistent depressive disorder with anxious distress, currently moderate  F34.1 300.4   2. Generalized anxiety disorder  F41.1 300.02     Roby presents via MyChart video visit for medication management. He has experienced stressors that have increased anxiety. Depression has remained persistent. Medication has been beneficial in decreasing symptom severity, but has not been taking medication consistently until one week ago. Will continue with medications as previously prescribed including Sertraline 50 mg daily and Strattera 60 mg daily. Currently seeing Alina Phelps LCSW.    -Continue Strattera 60 mg daily  -Continue Sertraline 50 mg daily   -Reviewed previous available documentation and no available labs on file.   -JONO reviewed and is  appropriate.    GOALS:  Short Term Goals: Patient will be compliant with medication, and patient will have no significant medication related side effects.  Patient will be engaged in psychotherapy as indicated.  Patient will report subjective improvement of symptoms.  Long term goals: To stabilize mood and treat/improve subjective symptoms, the patient will stay out of the hospital, the patient will be at an optimal level of functioning, and the patient will take all medications as prescribed.  The patient/guardian verbalized understanding and agreement with goals that were mutually set.    TREATMENT PLAN: Continue supportive psychotherapy efforts and medications as indicated for patient's diagnosis.  Pharmacological and Non-Pharmacological treatment options discussed during today's visit. Patient/Guardian acknowledged and verbally consented with current treatment plan and was educated on the importance of compliance with treatment and follow-up appointments.      MEDICATION ISSUES:  Discussed medication options and treatment plan of prescribed medication as well as the risks, benefits, any black box warnings, and side effects including potential falls, possible impaired driving, and metabolic adversities among others. Patient is agreeable to call the office with any worsening of symptoms or onset of side effects, or if any concerns or questions arise.  The contact information for the office is made available to the patient. Patient is agreeable to call 911 or go to the nearest ER should they begin having any SI/HI, or if any urgent concerns arise. No medication side effects or related complaints today.     MEDS ORDERED DURING VISIT:  No orders of the defined types were placed in this encounter.      FOLLOW UP:  Return in about 3 months (around 4/10/2025) for Recheck.          This document has been electronically signed by NICCI Gray  January 10, 2025 13:27 EST    Please note that portions of this note  were completed with a voice recognition program. Efforts were made to edit dictation, but occasionally words are mistranscribed.

## 2025-02-08 RX ORDER — ATOMOXETINE 60 MG/1
60 CAPSULE ORAL DAILY
Qty: 90 CAPSULE | Refills: 0 | Status: SHIPPED | OUTPATIENT
Start: 2025-02-08

## 2025-04-14 ENCOUNTER — TELEPHONE (OUTPATIENT)
Dept: PSYCHIATRY | Facility: CLINIC | Age: 35
End: 2025-04-14

## 2025-04-14 ENCOUNTER — TELEMEDICINE (OUTPATIENT)
Dept: PSYCHIATRY | Facility: CLINIC | Age: 35
End: 2025-04-14
Payer: COMMERCIAL

## 2025-04-14 DIAGNOSIS — F90.0 ATTENTION DEFICIT HYPERACTIVITY DISORDER (ADHD), PREDOMINANTLY INATTENTIVE TYPE: ICD-10-CM

## 2025-04-14 DIAGNOSIS — F34.1 PERSISTENT DEPRESSIVE DISORDER WITH ANXIOUS DISTRESS, CURRENTLY MODERATE: Primary | ICD-10-CM

## 2025-04-14 DIAGNOSIS — F41.1 GENERALIZED ANXIETY DISORDER: ICD-10-CM

## 2025-04-14 PROCEDURE — 1160F RVW MEDS BY RX/DR IN RCRD: CPT | Performed by: NURSE PRACTITIONER

## 2025-04-14 PROCEDURE — 1159F MED LIST DOCD IN RCRD: CPT | Performed by: NURSE PRACTITIONER

## 2025-04-14 PROCEDURE — 96127 BRIEF EMOTIONAL/BEHAV ASSMT: CPT | Performed by: NURSE PRACTITIONER

## 2025-04-14 PROCEDURE — 99214 OFFICE O/P EST MOD 30 MIN: CPT | Performed by: NURSE PRACTITIONER

## 2025-04-14 RX ORDER — VILOXAZINE HYDROCHLORIDE 100 MG/1
100 CAPSULE, EXTENDED RELEASE ORAL DAILY
Qty: 30 CAPSULE | Refills: 0 | Status: SHIPPED | OUTPATIENT
Start: 2025-04-14

## 2025-04-14 NOTE — PROGRESS NOTES
"Mode of Visit: Video   Location of patient: -HOME-   Location of provider: +[facility]+   You have chosen to receive care through a telehealth visit.   The patient has signed the video visit consent form.   The visit included audio and video interaction. No technical issues occurred during this visit.     Subjective   Roby Marvin is a 34 y.o. male who presents today for follow up    Chief Complaint:  poor concentration, anxiety and depression    History of Present Illness:   History of Present Illness  Roby Marvin presents for follow up via MyChart video visit for medication management. His most recent follow up visit was 1/10/25. Voices that he is sleepy today since he just woke up from a nap. Says that he has been struggling with sleep schedule. Says \"anxiety has been high\" lately. Feeling anxious, nervous, on edge and worries often. Says that he has been helping care for his mother and worries about caring for someone when he has no routine. Depression remains present, but voices that symptoms are not as severe as they were this time last year. Symptoms of depression include feeling down, depressed, low energy, poor sleep, appetite changes and feeling bad about self. Rates anxiety 9/10 and rates depression 7/10 on a 0-10 scale with 10 being the worst. Reports increase in ADHD symptoms, thoughts are scattered and feels \"all over the place.\" Stopped Strattera due to fear of becoming sterile after doing research on the medication. Falling asleep is hard, says that the time he is falling asleep seems to \"inch forward each night.\" Says that he strongly feels he has \"non 24 hour sleep disorder.\" Has times when sleeping too little while other times, he sleeps too much and has not specific schedule. Frequently feels fatigued. Has not had wellness exam in a long time. Has trouble getting to appointments due to transportation issues. Has not seen therapy recently. Having no schedule has prevented him from being physically " active. Denies SI/HI. PHQ-9 total score: 21 (previously 20), TESSIE-7 total score: 14 (previously 15).    Previous Psych Meds: fluoxetine, Wellbutrin SR, Trintellix    The following portions of the patient's history were reviewed and updated as appropriate: allergies, current medications, past family history, past medical history, past social history, past surgical history and problem list.    Past Medical History:   Diagnosis Date    ADHD 5/3/2024    Anxiety ~07/25/23    Depression ~07/25/23    Liver disease 10/17/23    Diagnosed with having fatty liver deposits, not sure if this falls under same category.     Social History     Socioeconomic History    Marital status: Single   Tobacco Use    Smoking status: Never     Passive exposure: Yes    Smokeless tobacco: Never    Tobacco comments:     Social smoker, never habitual   Vaping Use    Vaping status: Never Used   Substance and Sexual Activity    Alcohol use: Not Currently     Comment: Social drinker, never habitual    Drug use: Not Currently     Types: Marijuana     Comment: Smoked marijuana socially, never habitual    Sexual activity: Not Currently     Partners: Female     Birth control/protection: Condom, None, Birth control pill     Family History   Problem Relation Age of Onset    Drug abuse Brother     Drug abuse Brother     Seizures Cousin      Past Surgical History:  History reviewed. No pertinent surgical history.    Patient Active Problem List   Diagnosis    ADHD    Depression     Allergies   Allergen Reactions    Banana Itching and Nausea And Vomiting     Itchy throat      Current Outpatient Medications   Medication Sig Dispense Refill    allopurinol (ZYLOPRIM) 300 MG tablet Take 1 tablet by mouth Daily.      sertraline (Zoloft) 50 MG tablet Take 1 tablet by mouth Daily. 90 tablet 0    Viloxazine HCl ER (Qelbree) 100 MG capsule sustained-release 24 hr Take 1 capsule by mouth Daily. 30 capsule 0     No current facility-administered medications for this  visit.     Review of Systems   Constitutional:  Negative for activity change, appetite change, unexpected weight gain and unexpected weight loss.   Respiratory:  Negative for shortness of breath.    Cardiovascular:  Negative for chest pain.   Psychiatric/Behavioral:  Positive for decreased concentration, sleep disturbance, depressed mood and stress. Negative for suicidal ideas. The patient is nervous/anxious.      Physical Exam  Constitutional:       General: He is not in acute distress.     Appearance: Normal appearance.   Neurological:      Mental Status: He is alert.     Vitals:   [facility] Unable to obtain vital signs due to nature of remote visit.    Mental Status Exam:   Hygiene:    Appears good  Cooperation:  Cooperative  Eye Contact:   [facility] r/t video visit  Psychomotor Behavior:  Appropriate  Affect:  Full range and Appropriate  Mood: euthymic  Speech:  Normal  Thought Process:  Goal directed and Linear  Thought Content:  Mood congruent  Suicidal:  None  Homicidal:  None  Hallucinations:  None  Delusion:  None  Memory:  Intact  Orientation:  Person, Place, Time, and Situation  Reliability:  good  Insight:  Fair  Judgement:  Fair  Impulse Control:  Good    Assessment & Plan   Problems Addressed this Visit       ADHD    Relevant Medications    Viloxazine HCl ER (Qelbree) 100 MG capsule sustained-release 24 hr    Other Relevant Orders    Ambulatory Referral to Social Care Services (Amb Case Mgmt)     Other Visit Diagnoses         Persistent depressive disorder with anxious distress, currently moderate    -  Primary    Relevant Medications    Viloxazine HCl ER (Qelbree) 100 MG capsule sustained-release 24 hr    Other Relevant Orders    Ambulatory Referral to Social Care Services (Amb Case Mgmt)      Generalized anxiety disorder        Relevant Medications    Viloxazine HCl ER (Qelbree) 100 MG capsule sustained-release 24 hr    Other Relevant Orders     Ambulatory Referral to Social Care Services (Amb Case Mgmt)          Diagnoses         Codes Comments      Persistent depressive disorder with anxious distress, currently moderate    -  Primary ICD-10-CM: F34.1  ICD-9-CM: 300.4       Generalized anxiety disorder     ICD-10-CM: F41.1  ICD-9-CM: 300.02       Attention deficit hyperactivity disorder (ADHD), predominantly inattentive type     ICD-10-CM: F90.0  ICD-9-CM: 314.00           Visit Diagnoses:    ICD-10-CM ICD-9-CM   1. Persistent depressive disorder with anxious distress, currently moderate  F34.1 300.4   2. Generalized anxiety disorder  F41.1 300.02   3. Attention deficit hyperactivity disorder (ADHD), predominantly inattentive type  F90.0 314.00     Roby presents for medication management follow up. He continues to struggle with persistent depression and reports elevated anxiety along with uncontrolled ADHD symptoms. Discussed medication options, will start Qelbree 100 mg for ADHD symptoms. Will continue with Sertraline 50 mg daily for mood. Encouraged him to establish routine, engage in daily physical activity and practice good sleep hygiene. Strongly encouraged him to schedule appointment with therapy. Discussed referral for sleep study, but declines at this time due to transportation issues.  Also discussed transportation options ([facility] transport) that would allow him to attend appointments. Requesting form to be completed to obtain snap benefits as he reports employment has been difficult due to his current situation.     -Refer for case management   -Encouraged him to schedule wellness exam with PCP  -Schedule appointment with therapy, most recently seeing Annabel Phelps LCSW  -Start Qelbree 100 mg daily  -Continue Sertraline 50 mg daily   -Reviewed previous available documentation and no available labs on file.   -JONO reviewed and is appropriate.    GOALS:  Short Term Goals: Patient will be compliant with medication, and patient  will have no significant medication related side effects.  Patient will be engaged in psychotherapy as indicated.  Patient will report subjective improvement of symptoms.  Long term goals: To stabilize mood and treat/improve subjective symptoms, the patient will stay out of the hospital, the patient will be at an optimal level of functioning, and the patient will take all medications as prescribed.  The patient/guardian verbalized understanding and agreement with goals that were mutually set.    TREATMENT PLAN: Continue supportive psychotherapy efforts and medications as indicated for patient's diagnosis.  Pharmacological and Non-Pharmacological treatment options discussed during today's visit. Patient/Guardian acknowledged and verbally consented with current treatment plan and was educated on the importance of compliance with treatment and follow-up appointments.      MEDICATION ISSUES:  Discussed medication options and treatment plan of prescribed medication as well as the risks, benefits, any black box warnings, and side effects including potential falls, possible impaired driving, and metabolic adversities among others. Patient is agreeable to call the office with any worsening of symptoms or onset of side effects, or if any concerns or questions arise.  The contact information for the office is made available to the patient. Patient is agreeable to call 911 or go to the nearest ER should they begin having any SI/HI, or if any urgent concerns arise. No medication side effects or related complaints today.     MEDS ORDERED DURING VISIT:  New Medications Ordered This Visit   Medications    Viloxazine HCl ER (Qelbree) 100 MG capsule sustained-release 24 hr     Sig: Take 1 capsule by mouth Daily.     Dispense:  30 capsule     Refill:  0     FOLLOW UP:  Return in about 4 weeks (around 5/12/2025) for Recheck, Video visit.        This document has been electronically signed by NICCI Gray  April 28, 2025 22:20  EDT    Please note that portions of this note were completed with a voice recognition program. Efforts were made to edit dictation, but occasionally words are mistranscribed.

## 2025-04-30 ENCOUNTER — REFERRAL TRIAGE (OUTPATIENT)
Age: 35
End: 2025-04-30
Payer: COMMERCIAL

## 2025-05-02 ENCOUNTER — PATIENT OUTREACH (OUTPATIENT)
Age: 35
End: 2025-05-02
Payer: COMMERCIAL

## 2025-05-02 NOTE — OUTREACH NOTE
SW attempted contact with [facility]. SW will attempt again in a couple of business days. Recording of not available. No voicemail option.     Jannet RAYMUNDO -   Ambulatory Case Management    5/2/2025, 13:12 EDT

## 2025-05-06 ENCOUNTER — PATIENT OUTREACH (OUTPATIENT)
Age: 35
End: 2025-05-06
Payer: COMMERCIAL

## 2025-05-06 NOTE — OUTREACH NOTE
Social Work Assessment  Questions/Answers      Flowsheet Row Most Recent Value   Referral Source physician   Reason for Consult community resources, transportation   Preferred Language English   People in Home parent(s)   Current Living Arrangements apartment   Potentially Unsafe Housing Conditions none   In the past 12 months has the electric, gas, oil, or water company threatened to shut off services in your home? Pt Declined   Primary Care Provided by self   Provides Primary Care For no one   Quality of Family Relationships unable to assess   Source of Income none   Financial/Environmental Concerns unable to afford food, unable to afford medication(s), unable to afford rent/mortgage, other (see comments)   Application for Public Assistance obtained public assistance pending number   Q1: How often do you have a drink containing alcohol? Pt Declined   Q2: How many drinks containing alcohol do you have on a typical day when you are drinking? Pt Declined   Q3: How often do you have six or more drinks on one occasion? Pt Declined   Audit-C Score -1        SDOH updated and reviewed with the patient during this program:  --     Alcohol Use: Patient Declined (5/6/2025)    AUDIT-C     Frequency of Alcohol Consumption: Patient declined     Average Number of Drinks: Patient declined     Frequency of Binge Drinking: Patient declined      --     Depression: At risk (4/14/2025)    PHQ-2     PHQ-2 Score: 21          Financial Resource Strain: High Risk (5/6/2025)    Overall Financial Resource Strain (CARDIA)     Difficulty of Paying Living Expenses: Very hard      --     Food Insecurity: Patient Declined (5/6/2025)    Hunger Vital Sign     Worried About Running Out of Food in the Last Year: Patient declined     Ran Out of Food in the Last Year: Patient declined      --     Health Literacy: Unknown (5/6/2025)    Education     Preferred Language: English      --     Housing Stability: Patient Declined (5/6/2025)    Housing  Stability Vital Sign     Unable to Pay for Housing in the Last Year: Patient declined     Homeless in the Last Year: Patient declined      --     Interpersonal Safety: Not At Risk (5/6/2025)    Humiliation, Afraid, Rape, and Kick questionnaire     Fear of Current or Ex-Partner: No     Emotionally Abused: No     Physically Abused: No     Sexually Abused: No      --     Physical Activity: Patient Declined (5/6/2025)    Exercise Vital Sign     Days of Exercise per Week: Patient declined     Minutes of Exercise per Session: Patient declined      --     Social Connections: Patient Declined (5/6/2025)    Social Connection and Isolation Panel [NHANES]     Frequency of Communication with Friends and Family: Patient declined     Frequency of Social Gatherings with Friends and Family: Patient declined     Attends Yazdanism Services: Patient declined     Active Member of Clubs or Organizations: Patient declined     Attends Club or Organization Meetings: Patient declined     Marital Status: Patient declined      --     Stress: Stress Concern Present (5/6/2025)    [employer] - Occupational Stress Questionnaire     Feeling of Stress : Rather much      --     Tobacco Use: Medium Risk (4/14/2025)    Patient History     Smoking Tobacco Use: Never     Smokeless Tobacco Use: Never     Passive Exposure: Yes      --     Transportation Needs: Unmet Transportation Needs (5/6/2025)    PRAPARE - Transportation     Lack of Transportation (Medical): Yes     Lack of Transportation (Non-Medical): Yes      --     Utilities: Patient Declined (5/6/2025)    [facility] Utilities     Threatened with loss of utilities: Patient declined      Continuing Care   [facility] SOCIAL SECURITY DISABILITY OFFICES    [address] [place] [address]    Phone: [phone]    Request Status: Accepted    Services: Disability Benefits    Resource for: Disabilities, Financial Resource Strain, Utilities   [employer]     [address] [id number]    Phone: [phone]    Request Status: Accepted    Services: Financial Assistance, Help Find Housing, Public Housing    Resource for: Financial Resource Strain, Housing Stability, Utilities   Patient Outreach    SW spoke with pt re his referral for transportation and other community resources. SW discussed the [facility] resource directory site that has all of it's resources listed by category. Pt was receptive to this, and SW sent that information to his my Chart. Additionally SW discussed SSDI and employment topics. Pt interested in receiving more resources on this.SW sent links to those as well. SW reviewed [employer] as a resource. Pt expressed thanks. ADEBAYO will continue to monitor for the next thirty days.     Jannet RAYMUNDO -   Ambulatory Case Management    5/6/2025, 10:59 EDT

## 2025-05-08 ENCOUNTER — TELEMEDICINE (OUTPATIENT)
Dept: PSYCHIATRY | Facility: CLINIC | Age: 35
End: 2025-05-08
Payer: COMMERCIAL

## 2025-05-08 DIAGNOSIS — F90.0 ATTENTION DEFICIT HYPERACTIVITY DISORDER (ADHD), PREDOMINANTLY INATTENTIVE TYPE: Primary | ICD-10-CM

## 2025-05-08 DIAGNOSIS — F41.1 GENERALIZED ANXIETY DISORDER: ICD-10-CM

## 2025-05-08 DIAGNOSIS — F33.2 SEVERE EPISODE OF RECURRENT MAJOR DEPRESSIVE DISORDER, WITHOUT PSYCHOTIC FEATURES: ICD-10-CM

## 2025-05-08 NOTE — PROGRESS NOTES
Follow Up Video Visit     Date: 2025   Patient Name: Roby Marvin  : 1990   MRN: [id number]   Time In: 10:00 AM      Time Out:  10:58 AM    Referring Physician: Xuan Borrero MD    Chief Complaint:      ICD-10-CM ICD-9-CM   1. Attention deficit hyperactivity disorder (ADHD), predominantly inattentive type  F90.0 314.00   2. Severe episode of recurrent major depressive disorder, without psychotic features  F33.2 296.33   3. Generalized anxiety disorder  F41.1 300.02        The provider is located at [facility] [address], [address], [place], [place]. using a secure [employer] Video Visit through [employer] for assessment with Annabel Sterling LCSW, MSW.  The Patient is seen remotely at their home address in [place], using a private computer, phone or tablet.  Patient is being seen remotely via telehealth at home address in [place] and stated they are in a secure environment for this session using [facility] Chart. The patient's condition being diagnosed/treated is appropriate for telemedicine. The provider identified self as well as credentials. The patient, and/or patients guardian, consent to be seen remotely, and when consent is given they understand that the consent allows for patient identifiable information to be sent to a third party as needed. They may refuse to be seen remotely at any time. The electronic data is encrypted and password protected, and the patient and/or guardian has been advised of the potential risks to privacy not withstanding such measures.     Patient has chosen to receive care through a telehealth video visit and consents to use an audio/video connection for care today?   This visit has been scheduled as a video visit to comply with patient safety concerns in accordance with CDC recommendations.    History of Present Illness: Roby Marvin is a 34 y.o. male presents via video visit today for individual session. Pt describes having a difficult time  "since meeting with [facility] approx 5 months ago. Pt reports that he has been caring more for his mother more since she had an incident in December. Pt reports that his medication management provider, [facility]illa, has connected him to a  to assist him in getting local resources to assist him. Pt reports feeling that this has been helpful to him recently. Pt reports struggling the most with anxiousness and depression. Pt describes being able to \"escape anxiety and depression however it doesn't last long.\" Pt is waiting for  to assist him with getting into sleep study. Pt states that he is also experiencing financial stressors and reports feeling like a \"burden.\" Pt denies any SI/HI at this time.       Subjective      Review of Systems:   The following portions of the patient's history were reviewed and updated as appropriate: allergies, current medications, past family history, past medical history, past social history, past surgical history and problem list.     Interval History:   Deteriorated    Patient's Support Network Includes:  mother    Functional Status: Severe impairment    Medications:     Current Outpatient Medications:     allopurinol (ZYLOPRIM) 300 MG tablet, Take 1 tablet by mouth Daily., Disp: , Rfl:     sertraline (Zoloft) 50 MG tablet, Take 1 tablet by mouth Daily., Disp: 90 tablet, Rfl: 0    Viloxazine HCl ER (Qelbree) 100 MG capsule sustained-release 24 hr, Take 1 capsule by mouth Daily., Disp: 30 capsule, Rfl: 0      Objective     Mental Status Exam:   MENTAL STATUS EXAM   General Appearance:  Cleanly groomed and dressed  Eye Contact:  Good eye contact  Attitude:  Cooperative  Motor Activity:  Normal gait, posture  Muscle Strength:  Normal  Speech:  Normal rate, tone, volume  Mood and affect:  Normal, pleasant  Hopelessness:  Denies  Loneliness: Denies  Thought Process:  Logical  Associations/ Thought Content:  No delusions  Hallucinations:  None  Suicidal Ideations:  Not " present  Homicidal Ideation:  Not present  Sensorium:  Alert and clear  Orientation:  Person, place, time and situation  Immediate Recall, Recent, and Remote Memory:  Intact  Attention Span/ Concentration:  Good  Fund of Knowledge:  Appropriate for age and educational level  Intellectual Functioning:  Average range  Insight:  Good  Judgement:  Good  Reliability:  Good  Impulse Control:  Good       Assessment / Plan      Visit Diagnosis/Orders Placed This Visit:    ICD-10-CM ICD-9-CM   1. Attention deficit hyperactivity disorder (ADHD), predominantly inattentive type  F90.0 314.00   2. Severe episode of recurrent major depressive disorder, without psychotic features  F33.2 296.33   3. Generalized anxiety disorder  F41.1 300.02        PLAN:    Progress toward goal: Not at goal    Prognosis: Good with ongoing treatment    PLAN:  Safety: No acute safety concerns.  Therapist will continue to monitor.    Risk Assessment: Risk of self-harm acutely is low. Risk of self-harm chronically is also low, but could be further elevated in the event of treatment noncompliance and/or AODA.     TREATMENT PLAN/GOALS: Continue supportive psychotherapy efforts and medications as indicated. Treatment and medication options discussed during today's visit. Patient ackowledged and verbally consented to continue with current treatment plan and was educated on the importance of compliance with treatment and follow-up appointments. Patient seems reasonably able to adhere to treatment plan.      Allowed Patient to freely discuss issues  without interruption or judgement with unconditional positive regard, active listening skills, and empathy. Therapist provided a safe, confidential environment to facilitate the development of a positive therapeutic relationship and encouraged open, honest communication. Assisted Patient in identifying risk factors which would indicate the need for higher level of care including thoughts to harm self or others  and/or self-harming behavior and encouraged Patient to contact this office, call 911, or present to the nearest emergency room should any of these events occur. Discussed crisis intervention services and means to access. Patient adamantly and convincingly denies current suicidal or homicidal ideation or perceptual disturbance. Assisted Patient in processing session content; acknowledged and normalized Patient’s thoughts, feelings, and concerns by utilizing a person-centered approach in efforts to build appropriate rapport and a positive therapeutic relationship with open and honest communication.     Follow Up:   No follow-ups on file.        This document has been electronically signed by Annabel Phelps LCSW  May 8, 2025 11:00 EDT

## 2025-05-28 DIAGNOSIS — F90.0 ATTENTION DEFICIT HYPERACTIVITY DISORDER (ADHD), PREDOMINANTLY INATTENTIVE TYPE: ICD-10-CM

## 2025-05-28 RX ORDER — VILOXAZINE HYDROCHLORIDE 100 MG/1
100 CAPSULE, EXTENDED RELEASE ORAL DAILY
Qty: 30 CAPSULE | Refills: 0 | OUTPATIENT
Start: 2025-05-28

## 2025-06-04 ENCOUNTER — TELEMEDICINE (OUTPATIENT)
Dept: PSYCHIATRY | Facility: CLINIC | Age: 35
End: 2025-06-04
Payer: COMMERCIAL

## 2025-06-04 DIAGNOSIS — F90.0 ATTENTION DEFICIT HYPERACTIVITY DISORDER (ADHD), PREDOMINANTLY INATTENTIVE TYPE: Primary | ICD-10-CM

## 2025-06-04 DIAGNOSIS — F41.1 GENERALIZED ANXIETY DISORDER: ICD-10-CM

## 2025-06-04 DIAGNOSIS — F33.2 SEVERE EPISODE OF RECURRENT MAJOR DEPRESSIVE DISORDER, WITHOUT PSYCHOTIC FEATURES: ICD-10-CM

## 2025-06-04 NOTE — PROGRESS NOTES
Follow Up Video Visit     Date: 2025   Patient Name: Roby Marvin  : 1990   MRN: [id number]   Time In: 1:39 PM      Time Out: 2:22 PM     Referring Physician: Xuan Borrero MD    Chief Complaint:      ICD-10-CM ICD-9-CM   1. Attention deficit hyperactivity disorder (ADHD), predominantly inattentive type  F90.0 314.00   2. Severe episode of recurrent major depressive disorder, without psychotic features  F33.2 296.33   3. Generalized anxiety disorder  F41.1 300.02        The provider is located at [facility] [address], [address], [place], [place]. using a secure [employer] Video Visit through [employer] for assessment with Annabel Sterling LCSW, MSW.  The Patient is seen remotely at their home address in [place], using a private computer, phone or tablet.  Patient is being seen remotely via telehealth at home address in [place] and stated they are in a secure environment for this session using [facility] Chart. The patient's condition being diagnosed/treated is appropriate for telemedicine. The provider identified self as well as credentials. The patient, and/or patients guardian, consent to be seen remotely, and when consent is given they understand that the consent allows for patient identifiable information to be sent to a third party as needed. They may refuse to be seen remotely at any time. The electronic data is encrypted and password protected, and the patient and/or guardian has been advised of the potential risks to privacy not withstanding such measures.     Patient has chosen to receive care through a telehealth video visit and consents to use an audio/video connection for care today?   This visit has been scheduled as a video visit to comply with patient safety concerns in accordance with CDC recommendations.    History of Present Illness: Roby Marvin is a 34 y.o. male presents via video visit today for individual session. Pt reports that he is having a lot of  "complications. Pt describes a great deal of anxiety, irritability, depression and stress recently. Pt reports feeling that he has mixed feelings about the medication that he is on and how it is working for him. Pt reports that he feels \"really defeated\" and lays in bed for 2-3 hours a day or more after waking up. Pt reports that he feels that he has a lot of anxiety when having to attend \"scheduled\" things. Pt describes that he is getting more overwhelmed easily. Pt is struggling to navigate all the things in his life currently and struggling with the resources given to him. LCSW and pt discussed the form that he needs filled out to specify that he is in fact disabled/incapacitated for his SNAP benefits.       Subjective      Review of Systems:   The following portions of the patient's history were reviewed and updated as appropriate: allergies, current medications, past family history, past medical history, past social history, past surgical history and problem list.     Interval History:   Deteriorated    Patient's Support Network Includes:  friend(s)    Functional Status: Severe impairment    Medications:     Current Outpatient Medications:     allopurinol (ZYLOPRIM) 300 MG tablet, Take 1 tablet by mouth Daily., Disp: , Rfl:     sertraline (Zoloft) 50 MG tablet, Take 1 tablet by mouth Daily., Disp: 90 tablet, Rfl: 0    Viloxazine HCl ER (Qelbree) 100 MG capsule sustained-release 24 hr, Take 1 capsule by mouth Daily., Disp: 30 capsule, Rfl: 0      Objective     Mental Status Exam:   MENTAL STATUS EXAM   General Appearance:  Unkempt  Eye Contact:  Good eye contact  Attitude:  Cooperative and polite  Motor Activity:  Normal gait, posture  Muscle Strength:  Normal  Speech:  Normal rate, tone, volume  Mood and affect:  Depressed, anxious, blunted, irritable and flat  Hopelessness:  Denies  Loneliness: Denies  Thought Process:  Logical  Associations/ Thought Content:  No delusions  Hallucinations:  None  Suicidal " Ideations:  Not present  Homicidal Ideation:  Not present  Sensorium:  Alert  Orientation:  Person, place, time and situation  Immediate Recall, Recent, and Remote Memory:  Intact  Attention Span/ Concentration:  Good  Fund of Knowledge:  Appropriate for age and educational level  Intellectual Functioning:  Average range  Insight:  Good  Judgement:  Good  Reliability:  Good  Impulse Control:  Good       Assessment / Plan      Visit Diagnosis/Orders Placed This Visit:    ICD-10-CM ICD-9-CM   1. Attention deficit hyperactivity disorder (ADHD), predominantly inattentive type  F90.0 314.00   2. Severe episode of recurrent major depressive disorder, without psychotic features  F33.2 296.33   3. Generalized anxiety disorder  F41.1 300.02        PLAN:    Progress toward goal: Not at goal    Prognosis: Good with ongoing treatment    PLAN:  Safety: No acute safety concerns.  Therapist will continue to monitor.    Risk Assessment: Risk of self-harm acutely is low. Risk of self-harm chronically is also low, but could be further elevated in the event of treatment noncompliance and/or AODA.     TREATMENT PLAN/GOALS: Continue supportive psychotherapy efforts and medications as indicated. Treatment and medication options discussed during today's visit. Patient ackowledged and verbally consented to continue with current treatment plan and was educated on the importance of compliance with treatment and follow-up appointments. Patient seems reasonably able to adhere to treatment plan.      Allowed Patient to freely discuss issues  without interruption or judgement with unconditional positive regard, active listening skills, and empathy. Therapist provided a safe, confidential environment to facilitate the development of a positive therapeutic relationship and encouraged open, honest communication. Assisted Patient in identifying risk factors which would indicate the need for higher level of care including thoughts to harm self or  others and/or self-harming behavior and encouraged Patient to contact this office, call 911, or present to the nearest emergency room should any of these events occur. Discussed crisis intervention services and means to access. Patient adamantly and convincingly denies current suicidal or homicidal ideation or perceptual disturbance. Assisted Patient in processing session content; acknowledged and normalized Patient’s thoughts, feelings, and concerns by utilizing a person-centered approach in efforts to build appropriate rapport and a positive therapeutic relationship with open and honest communication.     Follow Up:   No follow-ups on file.        This document has been electronically signed by Annabel Phelps LCSW  June 4, 2025 13:51 EDT

## 2025-06-05 ENCOUNTER — PATIENT OUTREACH (OUTPATIENT)
Age: 35
End: 2025-06-05
Payer: COMMERCIAL

## 2025-06-05 NOTE — OUTREACH NOTE
Patient Outreach    SW F/u with pt to inquire about any further resources or needs presently. Pt states he has no further needs at this time. SW will D/c due to initial outreach goal being met.     Jannet RAYMUNDO -   Ambulatory Case Management    6/5/2025, 14:13 EDT

## 2025-06-09 ENCOUNTER — TELEMEDICINE (OUTPATIENT)
Dept: PSYCHIATRY | Facility: CLINIC | Age: 35
End: 2025-06-09
Payer: COMMERCIAL

## 2025-06-09 DIAGNOSIS — F34.1 PERSISTENT DEPRESSIVE DISORDER WITH ANXIOUS DISTRESS, CURRENTLY MODERATE: Primary | ICD-10-CM

## 2025-06-09 DIAGNOSIS — F90.0 ADHD, PREDOMINANTLY INATTENTIVE TYPE: ICD-10-CM

## 2025-06-09 DIAGNOSIS — F41.1 GENERALIZED ANXIETY DISORDER: ICD-10-CM

## 2025-06-09 PROCEDURE — 99214 OFFICE O/P EST MOD 30 MIN: CPT | Performed by: NURSE PRACTITIONER

## 2025-06-09 PROCEDURE — 1159F MED LIST DOCD IN RCRD: CPT | Performed by: NURSE PRACTITIONER

## 2025-06-09 PROCEDURE — 1160F RVW MEDS BY RX/DR IN RCRD: CPT | Performed by: NURSE PRACTITIONER

## 2025-06-09 RX ORDER — CITALOPRAM HYDROBROMIDE 10 MG/1
10 TABLET ORAL DAILY
Qty: 90 TABLET | Refills: 0 | Status: SHIPPED | OUTPATIENT
Start: 2025-06-09

## 2025-06-09 NOTE — PROGRESS NOTES
"Mode of Visit: Video   Location of patient: -HOME-   Location of provider: +[facility]+   You have chosen to receive care through a telehealth visit.   The patient has signed the video visit consent form.   The visit included audio and video interaction. No technical issues occurred during this visit.     Subjective   Roby Marvin is a 35 y.o. male who presents today for follow up    Chief Complaint:  poor concentration, anxiety and depression    History of Present Illness:   History of Present Illness  Roby Marvin presents for medication management via MyChart video visit. He was last appointment was 4/14/25. He says that depression continues to be an issue. Having negative and intrusive thoughts. Says that he often self isolates and dislikes confrontation. Feeling fatigued, defeated and depressed. Voices that he had \"manic episodes 2-3 days in a row.\" He describes these episodes with more symptoms that resemble depression with not getting out of bed, unable to elaborate on any other symptoms that would resemble shan. Also has constant anxiety, feeling nervous, anxious, on edge, easily annoyed/irritable. Voices that uncontrolled ADHD symptoms including difficulty with focus, poor concentration and becoming easily distracted worsens mood. Sleep varies, says that he either sleeps \"too much or not enough.\" Unable to quantify the number of hours he sleeps per night. Experiences financial stressors that negatively impact mood. Has been referred to Case management and resources have been provided. Denies SI/HI. PHQ-9 total score: 20, TESSIE-7 total score: 16.    Previous Psych Meds: fluoxetine, Wellbutrin SR, Trintellix    The following portions of the patient's history were reviewed and updated as appropriate: allergies, current medications, past family history, past medical history, past social history, past surgical history and problem list.    Past Medical History:   Diagnosis Date    ADHD 5/3/2024    Anxiety ~07/25/23 "    Depression ~07/25/23    Liver disease 10/17/23    Diagnosed with having fatty liver deposits, not sure if this falls under same category.     Social History     Socioeconomic History    Marital status: Single   Tobacco Use    Smoking status: Never     Passive exposure: Yes    Smokeless tobacco: Never    Tobacco comments:     Social smoker, never habitual   Vaping Use    Vaping status: Never Used   Substance and Sexual Activity    Alcohol use: Not Currently     Comment: Social drinker, never habitual    Drug use: Not Currently     Types: Marijuana     Comment: Smoked marijuana socially, never habitual    Sexual activity: Not Currently     Partners: Female     Birth control/protection: Condom, None, Birth control pill     Family History   Problem Relation Age of Onset    Drug abuse Brother     Drug abuse Brother     Seizures Cousin      Past Surgical History:  History reviewed. No pertinent surgical history.    Patient Active Problem List   Diagnosis    ADHD    Depression    Generalized anxiety disorder     Allergies   Allergen Reactions    Banana Itching and Nausea And Vomiting     Itchy throat      Current Outpatient Medications   Medication Sig Dispense Refill    allopurinol (ZYLOPRIM) 300 MG tablet Take 1 tablet by mouth Daily.      citalopram (CeleXA) 10 MG tablet Take 1 tablet by mouth Daily. 90 tablet 0    Viloxazine HCl ER (Qelbree) 100 MG capsule sustained-release 24 hr Take 1 capsule by mouth Daily. 30 capsule 0     No current facility-administered medications for this visit.     Review of Systems   Constitutional:  Negative for appetite change, unexpected weight gain and unexpected weight loss.   Respiratory:  Negative for shortness of breath.    Cardiovascular:  Negative for chest pain.   Psychiatric/Behavioral:  Positive for decreased concentration, sleep disturbance, depressed mood and stress. Negative for suicidal ideas. The patient is nervous/anxious.      Physical Exam  Constitutional:        General: He is not in acute distress.     Appearance: Normal appearance.   Neurological:      Mental Status: He is alert.     Vitals:   The patient was seen remotely today via a MyChart Video Visit through [facility]. Unable to obtain vital signs due to nature of remote visit.    Mental Status Exam:   Hygiene:   Appears good  Cooperation:  Cooperative  Eye Contact:  CATHI r/t video visit  Psychomotor Behavior:  Appropriate  Affect:  Full range and Appropriate  Mood: euthymic  Speech:  Normal  Thought Process:  Goal directed and Linear  Thought Content:  Mood congruent  Suicidal:  None  Homicidal:  None  Hallucinations:  None  Delusion:  None  Memory:  Intact  Orientation:  Person, Place, Time, and Situation  Reliability:  good  Insight:  Fair  Judgement:  Fair  Impulse Control:  Good    Assessment & Plan   Problems Addressed this Visit       Generalized anxiety disorder    Relevant Medications    citalopram (CeleXA) 10 MG tablet     Other Visit Diagnoses         Persistent depressive disorder with anxious distress, currently moderate    -  Primary    Relevant Medications    citalopram (CeleXA) 10 MG tablet      ADHD, predominantly inattentive type        Relevant Medications    citalopram (CeleXA) 10 MG tablet          Diagnoses         Codes Comments      Persistent depressive disorder with anxious distress, currently moderate    -  Primary ICD-10-CM: F34.1  ICD-9-CM: 300.4       Generalized anxiety disorder     ICD-10-CM: F41.1  ICD-9-CM: 300.02       ADHD, predominantly inattentive type     ICD-10-CM: F90.0  ICD-9-CM: 314.00           Visit Diagnoses:    ICD-10-CM ICD-9-CM   1. Persistent depressive disorder with anxious distress, currently moderate  F34.1 300.4   2. Generalized anxiety disorder  F41.1 300.02   3. ADHD, predominantly inattentive type  F90.0 314.00     Roby presents for medication management follow-up. Has not felt the current medication regimen works well to adequately control symptoms. Continues to  struggle with depression as well as bothersome symptoms of anxiety. Also struggles with uncontrolled ADHD symptoms. Discussed plan of care and medication options, agreeable to stop sertraline and start Celexa. Encouraged him to keep upcoming follow-up appointments with therapy to develop coping skills to manage symptoms in addition to medication regimen. He has been given resources for support through case management. Also discussed transportation options at previous visit that would allow him to schedule other appointments needed.    - Keep routine therapy appointments with Annabel Phelps LCSW  -Discontinue, taper Sertraline from 50 mg daily to 25 mg daily x 1 week then stop   -Start Celexa 10 mg daily     -Reviewed previous available documentation and no available labs on file.   -JONO reviewed and is appropriate.    GOALS:  Short Term Goals: Patient will be compliant with medication, and patient will have no significant medication related side effects.  Patient will be engaged in psychotherapy as indicated.  Patient will report subjective improvement of symptoms.  Long term goals: To stabilize mood and treat/improve subjective symptoms, the patient will stay out of the hospital, the patient will be at an optimal level of functioning, and the patient will take all medications as prescribed.  The patient/guardian verbalized understanding and agreement with goals that were mutually set.    TREATMENT PLAN: Continue supportive psychotherapy efforts and medications as indicated for patient's diagnosis.  Pharmacological and Non-Pharmacological treatment options discussed during today's visit. Patient/Guardian acknowledged and verbally consented with current treatment plan and was educated on the importance of compliance with treatment and follow-up appointments.      MEDICATION ISSUES:  Discussed medication options and treatment plan of prescribed medication as well as the risks, benefits, any black box  warnings, and side effects including potential falls, possible impaired driving, and metabolic adversities among others. Patient is agreeable to call the office with any worsening of symptoms or onset of side effects, or if any concerns or questions arise.  The contact information for the office is made available to the patient. Patient is agreeable to call 911 or go to the nearest ER should they begin having any SI/HI, or if any urgent concerns arise. No medication side effects or related complaints today.     MEDS ORDERED DURING VISIT:  New Medications Ordered This Visit   Medications    citalopram (CeleXA) 10 MG tablet     Sig: Take 1 tablet by mouth Daily.     Dispense:  90 tablet     Refill:  0     FOLLOW UP:  Return in about 6 weeks (around 7/21/2025) for Recheck, Video visit.        This document has been electronically signed by NICCI Gray  June 24, 2025 00:52 EDT    Please note that portions of this note were completed with a voice recognition program. Efforts were made to edit dictation, but occasionally words are mistranscribed.

## 2025-07-02 ENCOUNTER — TELEMEDICINE (OUTPATIENT)
Dept: PSYCHIATRY | Facility: CLINIC | Age: 35
End: 2025-07-02
Payer: COMMERCIAL

## 2025-07-02 DIAGNOSIS — F41.1 GENERALIZED ANXIETY DISORDER: ICD-10-CM

## 2025-07-02 DIAGNOSIS — F33.2 SEVERE EPISODE OF RECURRENT MAJOR DEPRESSIVE DISORDER, WITHOUT PSYCHOTIC FEATURES: ICD-10-CM

## 2025-07-02 DIAGNOSIS — F90.0 ATTENTION DEFICIT HYPERACTIVITY DISORDER (ADHD), PREDOMINANTLY INATTENTIVE TYPE: Primary | ICD-10-CM

## 2025-07-02 NOTE — PROGRESS NOTES
Follow Up Video Visit     Date: 2025   Patient Name: Roby Marvin  : 1990   MRN: [id number]   Time In: 1:30 PM      Time Out: 2:29 PM     Referring Physician: Xuan Borrero MD    Chief Complaint:      ICD-10-CM ICD-9-CM   1. Attention deficit hyperactivity disorder (ADHD), predominantly inattentive type  F90.0 314.00   2. Severe episode of recurrent major depressive disorder, without psychotic features  F33.2 296.33   3. Generalized anxiety disorder  F41.1 300.02        The provider is located at [facility] [address], [address], [place], [place] using a secure [place] Video Visit through [employer] for assessment with Annabel Sterling LCSW, MSW.  The Patient is seen remotely at their home address in [place], using a private computer, phone or tablet.  Patient is being seen remotely via telehealth at home address in [place] and stated they are in a secure environment for this session using [facility] Chart. The patient's condition being diagnosed/treated is appropriate for telemedicine. The provider identified self as well as credentials. The patient, and/or patients guardian, consent to be seen remotely, and when consent is given they understand that the consent allows for patient identifiable information to be sent to a third party as needed. They may refuse to be seen remotely at any time. The electronic data is encrypted and password protected, and the patient and/or guardian has been advised of the potential risks to privacy not withstanding such measures.     Patient has chosen to receive care through a telehealth video visit and consents to use an audio/video connection for care today?   This visit has been scheduled as a video visit to comply with patient safety concerns in accordance with CDC recommendations.    History of Present Illness: Roby Marvin is a 35 y.o. male presents via video visit today for individual session. Pt's medications were changed and he is  "no longer taking Quelbree and is on Celexa and can tell some difference but not much. Pt reports that he does not have anything specific happening other than experiencing some drowsiness and then there are times that he feels \"wirey.\" Pt describes Celexa being the \"tamest\" medication that he has taken recently. Pt describes that his anxiety and stress level varies from day to day and that he has a lot of situational stressors currently. Pt reports not feeling as irritable as a few weeks ago and did not want to be around others and feels this has improved. Pt and LCSW also discussed his abandonment issues and how this impacts him currently. Pt denies any SI/HI.       Subjective      Review of Systems:   The following portions of the patient's history were reviewed and updated as appropriate: allergies, current medications, past family history, past medical history, past social history, past surgical history and problem list.    Functional Status: Moderate impairment     Medications:     Current Outpatient Medications:     allopurinol (ZYLOPRIM) 300 MG tablet, Take 1 tablet by mouth Daily., Disp: , Rfl:     citalopram (CeleXA) 10 MG tablet, Take 1 tablet by mouth Daily., Disp: 90 tablet, Rfl: 0    Viloxazine HCl ER (Qelbree) 100 MG capsule sustained-release 24 hr, Take 1 capsule by mouth Daily., Disp: 30 capsule, Rfl: 0      Objective     Mental Status Exam:   MENTAL STATUS EXAM   General Appearance:  Cleanly groomed and dressed  Eye Contact:  Good eye contact  Attitude:  Cooperative and polite  Motor Activity:  Normal gait, posture  Muscle Strength:  Normal  Speech:  Normal rate, tone, volume  Mood and affect:  Normal, pleasant  Hopelessness:  Denies  Loneliness: Denies  Thought Process:  Logical  Associations/ Thought Content:  No delusions  Hallucinations:  None  Suicidal Ideations:  Not present  Homicidal Ideation:  Not present  Sensorium:  Alert and clear  Orientation:  Person, place, situation and " time  Immediate Recall, Recent, and Remote Memory:  Intact  Attention Span/ Concentration:  Good  Fund of Knowledge:  Appropriate for age and educational level  Intellectual Functioning:  Average range  Insight:  Good  Judgement:  Good  Reliability:  Good  Impulse Control:  Good       Assessment / Plan      Visit Diagnosis/Orders Placed This Visit:    ICD-10-CM ICD-9-CM   1. Attention deficit hyperactivity disorder (ADHD), predominantly inattentive type  F90.0 314.00   2. Severe episode of recurrent major depressive disorder, without psychotic features  F33.2 296.33   3. Generalized anxiety disorder  F41.1 300.02        PLAN:    Progress toward goal: Not at goal    Prognosis: Good with ongoing treatment    PLAN:  Safety: No acute safety concerns.  Therapist will continue to monitor.    Risk Assessment: Risk of self-harm acutely is low. Risk of self-harm chronically is also low, but could be further elevated in the event of treatment noncompliance and/or AODA.     TREATMENT PLAN/GOALS: Continue supportive psychotherapy efforts and medications as indicated. Treatment and medication options discussed during today's visit. Patient ackowledged and verbally consented to continue with current treatment plan and was educated on the importance of compliance with treatment and follow-up appointments. Patient seems reasonably able to adhere to treatment plan.      Allowed Patient to freely discuss issues  without interruption or judgement with unconditional positive regard, active listening skills, and empathy. Therapist provided a safe, confidential environment to facilitate the development of a positive therapeutic relationship and encouraged open, honest communication. Assisted Patient in identifying risk factors which would indicate the need for higher level of care including thoughts to harm self or others and/or self-harming behavior and encouraged Patient to contact this office, call 911, or present to the nearest  emergency room should any of these events occur. Discussed crisis intervention services and means to access. Patient adamantly and convincingly denies current suicidal or homicidal ideation or perceptual disturbance. Assisted Patient in processing session content; acknowledged and normalized Patient’s thoughts, feelings, and concerns by utilizing a person-centered approach in efforts to build appropriate rapport and a positive therapeutic relationship with open and honest communication.     Follow Up:   Return in about 3 weeks (around 7/23/2025).        This document has been electronically signed by Annabel Phelps LCSW  July 2, 2025 14:29 EDT

## 2025-07-09 ENCOUNTER — TELEMEDICINE (OUTPATIENT)
Dept: PSYCHIATRY | Facility: CLINIC | Age: 35
End: 2025-07-09
Payer: COMMERCIAL

## 2025-07-09 DIAGNOSIS — F34.1 PERSISTENT DEPRESSIVE DISORDER WITH ANXIOUS DISTRESS, CURRENTLY MODERATE: Primary | ICD-10-CM

## 2025-07-09 DIAGNOSIS — F41.1 GENERALIZED ANXIETY DISORDER: ICD-10-CM

## 2025-07-09 NOTE — PROGRESS NOTES
"Mode of Visit: Video   Location of patient: -HOME-   Location of provider: +[facility]   You have chosen to receive care through a telehealth visit.   The patient has signed the video visit consent form.   The visit included audio and video interaction. No technical issues occurred during this visit.     Subjective   Roby Marvin is a 35 y.o. male who presents today for follow up    Chief Complaint:  poor concentration, anxiety and depression    History of Present Illness:   History of Present Illness  Roby Marvin presents for medication management follow-up via [employer] video visit. His last follow-up appointment was 6/9/2025. Currently taking Celexa 10 mg daily that was initiated last visit, started medication approximately 3 weeks ago since prescriptions are mailed to his home. Voices that he feels medication is \"working,\" but unable to identify specific areas of improvement. Says that things are currently \"not too hot\" since he is likely going through a break-up. Has been in current relationship for approximately 4 months, but have known each other for a couple years after meeting through online shashi. Feels that biggest issue is miscommunication. Feels that this current situation has worsened symptoms of depression causing overall low mood. Has felt more down, depressed, decreased interest in doing things, low energy, appetite changes, increased need for sleep, trouble concentrating and feeling bad about himself. Rates current level of depression 9/10 on a 0-10 scale with 10 being the worst. Feels that depression worsened yesterday given current situation. Unable to rate level of depression prior to yesterday, but says level was \"less I think.\" Feels that anxiety has been around usual baseline with symptoms that are more manageable. Having increased need for sleep, voices that he went to bed around 6 PM yesterday evening then woke up around 1 AM and fell back to sleep until 5 AM. The number of hours he obtains " "each night often vary. Voices that he was given resources by case management, but says he has not been in the correct \"head space\" to review the information. Adamantly denies any current SI/HI. PHQ-9 total score: 25, TESSIE-7 total score: 17.    Previous Psych Meds: fluoxetine, sertraline, Wellbutrin SR, Trintellix, Strattera, Qelbree    The following portions of the patient's history were reviewed and updated as appropriate: allergies, current medications, past family history, past medical history, past social history, past surgical history and problem list.    Past Medical History:   Diagnosis Date    ADHD 5/3/2024    Anxiety ~07/25/23    Depression ~07/25/23    Liver disease 10/17/23    Diagnosed with having fatty liver deposits, not sure if this falls under same category.     Social History     Socioeconomic History    Marital status: Single   Tobacco Use    Smoking status: Never     Passive exposure: Yes    Smokeless tobacco: Never    Tobacco comments:     Social smoker, never habitual   Vaping Use    Vaping status: Never Used   Substance and Sexual Activity    Alcohol use: Not Currently     Comment: Social drinker, never habitual    Drug use: Not Currently     Types: Marijuana     Comment: Smoked marijuana socially, never habitual    Sexual activity: Not Currently     Partners: Female     Birth control/protection: Condom, None, Birth control pill     Family History   Problem Relation Age of Onset    Drug abuse Brother     Drug abuse Brother     Seizures Cousin      Past Surgical History:  History reviewed. No pertinent surgical history.    Patient Active Problem List   Diagnosis    ADHD    Depression    Generalized anxiety disorder     Allergies   Allergen Reactions    Banana Itching and Nausea And Vomiting     Itchy throat      Current Outpatient Medications   Medication Sig Dispense Refill    allopurinol (ZYLOPRIM) 300 MG tablet Take 1 tablet by mouth Daily.      citalopram (CeleXA) 10 MG tablet Take 1 tablet " by mouth Daily. 90 tablet 0     No current facility-administered medications for this visit.     Review of Systems   Constitutional:  Negative for activity change, appetite change, unexpected weight gain and unexpected weight loss.   Respiratory:  Negative for shortness of breath.    Cardiovascular:  Negative for chest pain.   Psychiatric/Behavioral:  Positive for decreased concentration, sleep disturbance, depressed mood and stress. Negative for suicidal ideas. The patient is nervous/anxious.      Physical Exam  Constitutional:       General: He is not in acute distress.     Appearance: Normal appearance.   Neurological:      Mental Status: He is alert.     Vitals:   The patient was seen remotely today via a MyChart Video Visit through [facility]. Unable to obtain vital signs due to nature of remote visit.    Mental Status Exam:   Hygiene:   Appears good  Cooperation:  Cooperative  Eye Contact:  CATHI r/t video visit  Psychomotor Behavior:  Appropriate  Affect:  Blunted  Mood: euthymic  Speech:  Normal  Thought Process:  Goal directed and Linear  Thought Content:  Mood congruent  Suicidal:  None  Homicidal:  None  Hallucinations:  None  Delusion:  None  Memory:  Intact  Orientation:  Person, Place, Time, and Situation  Reliability:  fair  Insight:  Fair  Judgement:  Fair  Impulse Control:  Good    Assessment & Plan   Problems Addressed this Visit       Generalized anxiety disorder     Other Visit Diagnoses         Persistent depressive disorder with anxious distress, currently moderate    -  Primary          Diagnoses         Codes Comments      Persistent depressive disorder with anxious distress, currently moderate    -  Primary ICD-10-CM: F34.1  ICD-9-CM: 300.4       Generalized anxiety disorder     ICD-10-CM: F41.1  ICD-9-CM: 300.02           Visit Diagnoses:    ICD-10-CM ICD-9-CM   1. Persistent depressive disorder with anxious distress, currently moderate  F34.1 300.4   2. Generalized anxiety disorder  F41.1 300.02      Today's visit is for medication management. He has experienced some recent stressors that have caused decline in mood. Experiencing more depression as well as anxiety, but does feel that medication has been beneficial to decrease severity of symptoms. Discussed plan of care medications, agreeable to continue with Celexa 10 mg daily to provide adequate trial at current dose. Encouraged him to review resources provided by Case management to determine if any options available that would relieve some of his situational stressors. Encouraged to continue with routine therapy.  -Continue therapy with Annabel Phelps LCSW  -Continue Celexa 10 mg daily   -Reviewed previous available documentation and no available labs on file.   -JONO reviewed and is appropriate.    GOALS:  Short Term Goals: Patient will be compliant with medication, and patient will have no significant medication related side effects.  Patient will be engaged in psychotherapy as indicated.  Patient will report subjective improvement of symptoms.  Long term goals: To stabilize mood and treat/improve subjective symptoms, the patient will stay out of the hospital, the patient will be at an optimal level of functioning, and the patient will take all medications as prescribed.  The patient/guardian verbalized understanding and agreement with goals that were mutually set.    TREATMENT PLAN: Continue supportive psychotherapy efforts and medications as indicated for patient's diagnosis.  Pharmacological and Non-Pharmacological treatment options discussed during today's visit. Patient/Guardian acknowledged and verbally consented with current treatment plan and was educated on the importance of compliance with treatment and follow-up appointments.      MEDICATION ISSUES:  Discussed medication options and treatment plan of prescribed medication as well as the risks, benefits, any black box warnings, and side effects including potential falls, possible  impaired driving, and metabolic adversities among others. Patient is agreeable to call the office with any worsening of symptoms or onset of side effects, or if any concerns or questions arise.  The contact information for the office is made available to the patient. Patient is agreeable to call 911 or go to the nearest ER should they begin having any SI/HI, or if any urgent concerns arise. No medication side effects or related complaints today.     MEDS ORDERED DURING VISIT:  No orders of the defined types were placed in this encounter.    FOLLOW UP:  Return in about 4 weeks (around 8/6/2025) for Recheck, Video visit.        This document has been electronically signed by NICCI Gray  July 9, 2025 12:49 EDT    Please note that portions of this note were completed with a voice recognition program. Efforts were made to edit dictation, but occasionally words are mistranscribed.

## 2025-07-30 ENCOUNTER — TELEMEDICINE (OUTPATIENT)
Dept: PSYCHIATRY | Facility: CLINIC | Age: 35
End: 2025-07-30
Payer: COMMERCIAL

## 2025-07-30 DIAGNOSIS — F33.2 SEVERE EPISODE OF RECURRENT MAJOR DEPRESSIVE DISORDER, WITHOUT PSYCHOTIC FEATURES: Primary | ICD-10-CM

## 2025-07-30 DIAGNOSIS — F41.1 GENERALIZED ANXIETY DISORDER: ICD-10-CM

## 2025-07-30 NOTE — PROGRESS NOTES
Follow Up Video Visit     Date: 2025   Patient Name: Roby Marvin  : 1990   MRN: [id number]   Time In: 3:30 PM      Time Out: 4:29 PM     Referring Physician: Xuan Borrero MD    Chief Complaint:      ICD-10-CM ICD-9-CM   1. Severe episode of recurrent major depressive disorder, without psychotic features  F33.2 296.33   2. Generalized anxiety disorder  F41.1 300.02        The provider is located at [facility] [address], [address], [place], [place] using a secure [employer] Video Visit through [employer] for assessment with Annabel Sterling, TALW, MSW.  The Patient is seen remotely at their home address in [place], using a private computer, phone or tablet.  Patient is being seen remotely via telehealth at home address in [place] and stated they are in a secure environment for this session using [facility]. The patient's condition being diagnosed/treated is appropriate for telemedicine. The provider identified self as well as credentials. The patient, and/or patients guardian, consent to be seen remotely, and when consent is given they understand that the consent allows for patient identifiable information to be sent to a third party as needed. They may refuse to be seen remotely at any time. The electronic data is encrypted and password protected, and the patient and/or guardian has been advised of the potential risks to privacy not withstanding such measures.     Patient has chosen to receive care through a telehealth video visit and consents to use an audio/video connection for care today?   This visit has been scheduled as a video visit to comply with patient safety concerns in accordance with CDC recommendations.    History of Present Illness: Roby Marvin is a 35 y.o. male presents via video visit today for Individual session. Pt describes attempting to work through and navigating interpersonal relationships. Pt describes experiencing a break-up and then working  through attempting to be friends afterwards. Pt describes this is confusing and difficult. Pt and LCSW processed this together. Pt is now in a online community group that he is volunteering his time for [employer]. Pt has been working to organize and communicate with others. LCSW asked pt about obtaining employment and pt describes this not being something he can do because he gets very stressed and overwhelmed at any amount of accumulative stress. Pt describes also attempting to cope with the loss of a really important relationship to him. Pt notes feeling more irritable recently.       Subjective      Review of Systems:   The following portions of the patient's history were reviewed and updated as appropriate: allergies, current medications, past family history, past medical history, past social history, past surgical history and problem list.     Interval History:   Deteriorated    Patient's Support Network Includes:  mother    Functional Status: Severe impairment    Medications:     Current Outpatient Medications:     allopurinol (ZYLOPRIM) 300 MG tablet, Take 1 tablet by mouth Daily., Disp: , Rfl:     citalopram (CeleXA) 10 MG tablet, Take 1 tablet by mouth Daily., Disp: 90 tablet, Rfl: 0      Objective     Mental Status Exam:   MENTAL STATUS EXAM   General Appearance:  Cleanly groomed and dressed  Eye Contact:  Good eye contact  Attitude:  Cooperative and polite  Motor Activity:  Normal gait, posture  Muscle Strength:  Normal  Speech:  Normal rate, tone, volume  Mood and affect:  Normal, pleasant  Hopelessness:  Denies  Loneliness: Denies  Thought Process:  Logical  Associations/ Thought Content:  No delusions  Hallucinations:  None  Suicidal Ideations:  Not present  Homicidal Ideation:  Not present  Sensorium:  Alert  Orientation:  Person, place, time and situation  Immediate Recall, Recent, and Remote Memory:  Intact  Attention Span/ Concentration:  Good  Fund of Knowledge:  Appropriate for age and  educational level  Intellectual Functioning:  Average range  Insight:  Good  Judgement:  Good  Reliability:  Good  Impulse Control:  Good       Assessment / Plan      Visit Diagnosis/Orders Placed This Visit:    ICD-10-CM ICD-9-CM   1. Severe episode of recurrent major depressive disorder, without psychotic features  F33.2 296.33   2. Generalized anxiety disorder  F41.1 300.02        PLAN:    Progress toward goal: Not at goal    Prognosis: Good with ongoing treatment    PLAN:  Safety: No acute safety concerns.  Therapist will continue to monitor.    Risk Assessment: Risk of self-harm acutely is low. Risk of self-harm chronically is also low, but could be further elevated in the event of treatment noncompliance and/or AODA.     TREATMENT PLAN/GOALS: Continue supportive psychotherapy efforts and medications as indicated. Treatment and medication options discussed during today's visit. Patient ackowledged and verbally consented to continue with current treatment plan and was educated on the importance of compliance with treatment and follow-up appointments. Patient seems reasonably able to adhere to treatment plan.      Allowed Patient to freely discuss issues  without interruption or judgement with unconditional positive regard, active listening skills, and empathy. Therapist provided a safe, confidential environment to facilitate the development of a positive therapeutic relationship and encouraged open, honest communication. Assisted Patient in identifying risk factors which would indicate the need for higher level of care including thoughts to harm self or others and/or self-harming behavior and encouraged Patient to contact this office, call 911, or present to the nearest emergency room should any of these events occur. Discussed crisis intervention services and means to access. Patient adamantly and convincingly denies current suicidal or homicidal ideation or perceptual disturbance. Assisted Patient in  processing session content; acknowledged and normalized Patient’s thoughts, feelings, and concerns by utilizing a person-centered approach in efforts to build appropriate rapport and a positive therapeutic relationship with open and honest communication.     Follow Up:   Return in about 2 weeks (around 8/13/2025) for therapy session, Video visit.        This document has been electronically signed by Annabel Phelps LCSW  July 30, 2025 16:30 EDT

## 2025-08-05 ENCOUNTER — TELEPHONE (OUTPATIENT)
Dept: PSYCHIATRY | Facility: CLINIC | Age: 35
End: 2025-08-05
Payer: COMMERCIAL

## 2025-08-07 ENCOUNTER — TELEMEDICINE (OUTPATIENT)
Dept: PSYCHIATRY | Facility: CLINIC | Age: 35
End: 2025-08-07
Payer: COMMERCIAL

## 2025-08-07 DIAGNOSIS — F41.1 GENERALIZED ANXIETY DISORDER: ICD-10-CM

## 2025-08-07 DIAGNOSIS — F90.0 ADHD, PREDOMINANTLY INATTENTIVE TYPE: ICD-10-CM

## 2025-08-07 DIAGNOSIS — F34.1 PERSISTENT DEPRESSIVE DISORDER WITH ANXIOUS DISTRESS, CURRENTLY MODERATE: Primary | ICD-10-CM

## 2025-08-07 RX ORDER — CITALOPRAM HYDROBROMIDE 10 MG/1
10 TABLET ORAL DAILY
Qty: 90 TABLET | Refills: 0 | Status: SHIPPED | OUTPATIENT
Start: 2025-08-07

## 2025-08-13 ENCOUNTER — TELEMEDICINE (OUTPATIENT)
Dept: PSYCHIATRY | Facility: CLINIC | Age: 35
End: 2025-08-13
Payer: COMMERCIAL

## 2025-08-13 DIAGNOSIS — F90.0 ATTENTION DEFICIT HYPERACTIVITY DISORDER (ADHD), PREDOMINANTLY INATTENTIVE TYPE: Primary | ICD-10-CM

## 2025-08-13 DIAGNOSIS — F41.1 GENERALIZED ANXIETY DISORDER: ICD-10-CM

## 2025-08-13 DIAGNOSIS — F33.2 SEVERE EPISODE OF RECURRENT MAJOR DEPRESSIVE DISORDER, WITHOUT PSYCHOTIC FEATURES: ICD-10-CM
